# Patient Record
Sex: MALE | Race: WHITE | NOT HISPANIC OR LATINO | Employment: OTHER | URBAN - METROPOLITAN AREA
[De-identification: names, ages, dates, MRNs, and addresses within clinical notes are randomized per-mention and may not be internally consistent; named-entity substitution may affect disease eponyms.]

---

## 2017-01-02 ENCOUNTER — DOCUMENTATION ONLY (OUTPATIENT)
Dept: OTHER | Facility: CLINIC | Age: 67
End: 2017-01-02

## 2017-01-02 DIAGNOSIS — Z71.89 ACP (ADVANCE CARE PLANNING): Primary | Chronic | ICD-10-CM

## 2017-03-07 ENCOUNTER — APPOINTMENT (OUTPATIENT)
Dept: ULTRASOUND IMAGING | Facility: CLINIC | Age: 67
End: 2017-03-07
Attending: PHYSICIAN ASSISTANT
Payer: COMMERCIAL

## 2017-03-07 ENCOUNTER — HOSPITAL ENCOUNTER (OUTPATIENT)
Facility: CLINIC | Age: 67
Setting detail: OBSERVATION
Discharge: HOME | End: 2017-03-07
Attending: STUDENT IN AN ORGANIZED HEALTH CARE EDUCATION/TRAINING PROGRAM | Admitting: FAMILY MEDICINE
Payer: COMMERCIAL

## 2017-03-07 ENCOUNTER — ANESTHESIA (OUTPATIENT)
Dept: SURGERY | Facility: CLINIC | Age: 67
End: 2017-03-07
Payer: COMMERCIAL

## 2017-03-07 ENCOUNTER — APPOINTMENT (OUTPATIENT)
Dept: GENERAL RADIOLOGY | Facility: CLINIC | Age: 67
End: 2017-03-07
Attending: PHYSICIAN ASSISTANT
Payer: COMMERCIAL

## 2017-03-07 ENCOUNTER — APPOINTMENT (OUTPATIENT)
Dept: CT IMAGING | Facility: CLINIC | Age: 67
End: 2017-03-07
Attending: STUDENT IN AN ORGANIZED HEALTH CARE EDUCATION/TRAINING PROGRAM
Payer: COMMERCIAL

## 2017-03-07 ENCOUNTER — ANESTHESIA EVENT (OUTPATIENT)
Dept: SURGERY | Facility: CLINIC | Age: 67
End: 2017-03-07
Payer: COMMERCIAL

## 2017-03-07 ENCOUNTER — SURGERY (OUTPATIENT)
Age: 67
End: 2017-03-07

## 2017-03-07 VITALS
HEART RATE: 79 BPM | TEMPERATURE: 98.8 F | RESPIRATION RATE: 16 BRPM | SYSTOLIC BLOOD PRESSURE: 129 MMHG | OXYGEN SATURATION: 94 % | WEIGHT: 214.07 LBS | HEIGHT: 74 IN | DIASTOLIC BLOOD PRESSURE: 71 MMHG | BODY MASS INDEX: 27.47 KG/M2

## 2017-03-07 DIAGNOSIS — I71.40 ABDOMINAL AORTIC ANEURYSM (AAA) WITHOUT RUPTURE (H): ICD-10-CM

## 2017-03-07 DIAGNOSIS — R10.31 RLQ ABDOMINAL PAIN: ICD-10-CM

## 2017-03-07 DIAGNOSIS — N13.2 HYDRONEPHROSIS WITH URINARY OBSTRUCTION DUE TO RENAL CALCULUS: ICD-10-CM

## 2017-03-07 DIAGNOSIS — K80.20 CALCULUS OF GALLBLADDER WITHOUT CHOLECYSTITIS WITHOUT OBSTRUCTION: ICD-10-CM

## 2017-03-07 DIAGNOSIS — I48.91 ATRIAL FIBRILLATION, UNSPECIFIED TYPE (H): ICD-10-CM

## 2017-03-07 DIAGNOSIS — N20.0 KIDNEY STONE: ICD-10-CM

## 2017-03-07 PROBLEM — E87.6 HYPOKALEMIA: Status: ACTIVE | Noted: 2017-03-07

## 2017-03-07 PROBLEM — R10.9 ABDOMINAL PAIN: Status: ACTIVE | Noted: 2017-03-07

## 2017-03-07 PROBLEM — C67.9 MALIGNANT NEOPLASM OF URINARY BLADDER (H): Status: ACTIVE | Noted: 2017-01-13

## 2017-03-07 PROBLEM — C67.9 MALIGNANT NEOPLASM OF URINARY BLADDER (H): Chronic | Status: ACTIVE | Noted: 2017-01-13

## 2017-03-07 LAB
ALBUMIN SERPL-MCNC: 3.5 G/DL (ref 3.4–5)
ALBUMIN UR-MCNC: 10 MG/DL
ALP SERPL-CCNC: 102 U/L (ref 40–150)
ALT SERPL W P-5'-P-CCNC: 21 U/L (ref 0–70)
ANION GAP SERPL CALCULATED.3IONS-SCNC: 9 MMOL/L (ref 3–14)
APPEARANCE UR: ABNORMAL
AST SERPL W P-5'-P-CCNC: 25 U/L (ref 0–45)
BASOPHILS # BLD AUTO: 0.1 10E9/L (ref 0–0.2)
BASOPHILS NFR BLD AUTO: 1 %
BILIRUB SERPL-MCNC: 0.8 MG/DL (ref 0.2–1.3)
BILIRUB UR QL STRIP: NEGATIVE
BUN SERPL-MCNC: 23 MG/DL (ref 7–30)
CALCIUM SERPL-MCNC: 8.9 MG/DL (ref 8.5–10.1)
CHLORIDE SERPL-SCNC: 100 MMOL/L (ref 94–109)
CO2 SERPL-SCNC: 30 MMOL/L (ref 20–32)
COLOR UR AUTO: YELLOW
CREAT SERPL-MCNC: 1.22 MG/DL (ref 0.66–1.25)
DIFFERENTIAL METHOD BLD: NORMAL
EOSINOPHIL # BLD AUTO: 0.1 10E9/L (ref 0–0.7)
EOSINOPHIL NFR BLD AUTO: 1.4 %
ERYTHROCYTE [DISTWIDTH] IN BLOOD BY AUTOMATED COUNT: 12.2 % (ref 10–15)
GFR SERPL CREATININE-BSD FRML MDRD: 59 ML/MIN/1.7M2
GLUCOSE SERPL-MCNC: 101 MG/DL (ref 70–99)
GLUCOSE UR STRIP-MCNC: NEGATIVE MG/DL
HCT VFR BLD AUTO: 41.7 % (ref 40–53)
HGB BLD-MCNC: 14.1 G/DL (ref 13.3–17.7)
HGB UR QL STRIP: ABNORMAL
IMM GRANULOCYTES # BLD: 0 10E9/L (ref 0–0.4)
IMM GRANULOCYTES NFR BLD: 0 %
INR PPP: 3.01 (ref 0.86–1.14)
KETONES UR STRIP-MCNC: 10 MG/DL
LEUKOCYTE ESTERASE UR QL STRIP: NEGATIVE
LIPASE SERPL-CCNC: 108 U/L (ref 73–393)
LYMPHOCYTES # BLD AUTO: 0.8 10E9/L (ref 0.8–5.3)
LYMPHOCYTES NFR BLD AUTO: 10.3 %
MCH RBC QN AUTO: 31.8 PG (ref 26.5–33)
MCHC RBC AUTO-ENTMCNC: 33.8 G/DL (ref 31.5–36.5)
MCV RBC AUTO: 94 FL (ref 78–100)
MONOCYTES # BLD AUTO: 0.6 10E9/L (ref 0–1.3)
MONOCYTES NFR BLD AUTO: 8.4 %
MUCOUS THREADS #/AREA URNS LPF: PRESENT /LPF
NEUTROPHILS # BLD AUTO: 5.8 10E9/L (ref 1.6–8.3)
NEUTROPHILS NFR BLD AUTO: 78.9 %
NITRATE UR QL: NEGATIVE
PH UR STRIP: 6.5 PH (ref 5–7)
PLATELET # BLD AUTO: 210 10E9/L (ref 150–450)
POTASSIUM SERPL-SCNC: 3.2 MMOL/L (ref 3.4–5.3)
PROT SERPL-MCNC: 6.6 G/DL (ref 6.8–8.8)
RBC # BLD AUTO: 4.43 10E12/L (ref 4.4–5.9)
RBC #/AREA URNS AUTO: >182 /HPF (ref 0–2)
SODIUM SERPL-SCNC: 139 MMOL/L (ref 133–144)
SP GR UR STRIP: 1.01 (ref 1–1.03)
TROPONIN I SERPL-MCNC: NORMAL UG/L (ref 0–0.04)
URN SPEC COLLECT METH UR: ABNORMAL
UROBILINOGEN UR STRIP-MCNC: NORMAL MG/DL (ref 0–2)
WBC # BLD AUTO: 7.4 10E9/L (ref 4–11)
WBC #/AREA URNS AUTO: 0 /HPF (ref 0–2)

## 2017-03-07 PROCEDURE — 81001 URINALYSIS AUTO W/SCOPE: CPT | Performed by: STUDENT IN AN ORGANIZED HEALTH CARE EDUCATION/TRAINING PROGRAM

## 2017-03-07 PROCEDURE — 99285 EMERGENCY DEPT VISIT HI MDM: CPT | Mod: 25 | Performed by: STUDENT IN AN ORGANIZED HEALTH CARE EDUCATION/TRAINING PROGRAM

## 2017-03-07 PROCEDURE — C1769 GUIDE WIRE: HCPCS | Performed by: UROLOGY

## 2017-03-07 PROCEDURE — 36000052 ZZH SURGERY LEVEL 2 EA 15 ADDTL MIN: Performed by: UROLOGY

## 2017-03-07 PROCEDURE — 85610 PROTHROMBIN TIME: CPT | Performed by: STUDENT IN AN ORGANIZED HEALTH CARE EDUCATION/TRAINING PROGRAM

## 2017-03-07 PROCEDURE — 25000125 ZZHC RX 250: Performed by: NURSE ANESTHETIST, CERTIFIED REGISTERED

## 2017-03-07 PROCEDURE — 93010 ELECTROCARDIOGRAM REPORT: CPT | Performed by: STUDENT IN AN ORGANIZED HEALTH CARE EDUCATION/TRAINING PROGRAM

## 2017-03-07 PROCEDURE — 25800025 ZZH RX 258: Performed by: NURSE ANESTHETIST, CERTIFIED REGISTERED

## 2017-03-07 PROCEDURE — 36000054 ZZH SURGERY LEVEL 2 W FLUORO 1ST 30 MIN: Performed by: UROLOGY

## 2017-03-07 PROCEDURE — C2617 STENT, NON-COR, TEM W/O DEL: HCPCS | Performed by: UROLOGY

## 2017-03-07 PROCEDURE — 27210794 ZZH OR GENERAL SUPPLY STERILE: Performed by: UROLOGY

## 2017-03-07 PROCEDURE — 85025 COMPLETE CBC W/AUTO DIFF WBC: CPT | Performed by: STUDENT IN AN ORGANIZED HEALTH CARE EDUCATION/TRAINING PROGRAM

## 2017-03-07 PROCEDURE — 80053 COMPREHEN METABOLIC PANEL: CPT | Performed by: STUDENT IN AN ORGANIZED HEALTH CARE EDUCATION/TRAINING PROGRAM

## 2017-03-07 PROCEDURE — 40000277 XR SURGERY CARM FLUORO LESS THAN 5 MIN W STILLS

## 2017-03-07 PROCEDURE — 99285 EMERGENCY DEPT VISIT HI MDM: CPT | Mod: 25

## 2017-03-07 PROCEDURE — 74010 XR KUB: CPT | Mod: 52

## 2017-03-07 PROCEDURE — 84484 ASSAY OF TROPONIN QUANT: CPT | Performed by: STUDENT IN AN ORGANIZED HEALTH CARE EDUCATION/TRAINING PROGRAM

## 2017-03-07 PROCEDURE — 25500064 ZZH RX 255 OP 636: Performed by: STUDENT IN AN ORGANIZED HEALTH CARE EDUCATION/TRAINING PROGRAM

## 2017-03-07 PROCEDURE — 25000125 ZZHC RX 250: Performed by: STUDENT IN AN ORGANIZED HEALTH CARE EDUCATION/TRAINING PROGRAM

## 2017-03-07 PROCEDURE — 96361 HYDRATE IV INFUSION ADD-ON: CPT

## 2017-03-07 PROCEDURE — 96374 THER/PROPH/DIAG INJ IV PUSH: CPT

## 2017-03-07 PROCEDURE — 96376 TX/PRO/DX INJ SAME DRUG ADON: CPT

## 2017-03-07 PROCEDURE — 37000009 ZZH ANESTHESIA TECHNICAL FEE, EACH ADDTL 15 MIN: Performed by: UROLOGY

## 2017-03-07 PROCEDURE — 83690 ASSAY OF LIPASE: CPT | Performed by: STUDENT IN AN ORGANIZED HEALTH CARE EDUCATION/TRAINING PROGRAM

## 2017-03-07 PROCEDURE — 25000128 H RX IP 250 OP 636: Performed by: UROLOGY

## 2017-03-07 PROCEDURE — 52332 CYSTOSCOPY AND TREATMENT: CPT | Mod: RT | Performed by: UROLOGY

## 2017-03-07 PROCEDURE — 40000305 ZZH STATISTIC PRE PROC ASSESS I: Performed by: UROLOGY

## 2017-03-07 PROCEDURE — 74177 CT ABD & PELVIS W/CONTRAST: CPT

## 2017-03-07 PROCEDURE — 99236 HOSP IP/OBS SAME DATE HI 85: CPT | Performed by: FAMILY MEDICINE

## 2017-03-07 PROCEDURE — G0378 HOSPITAL OBSERVATION PER HR: HCPCS

## 2017-03-07 PROCEDURE — 25000132 ZZH RX MED GY IP 250 OP 250 PS 637: Performed by: FAMILY MEDICINE

## 2017-03-07 PROCEDURE — 37000008 ZZH ANESTHESIA TECHNICAL FEE, 1ST 30 MIN: Performed by: UROLOGY

## 2017-03-07 PROCEDURE — 76705 ECHO EXAM OF ABDOMEN: CPT

## 2017-03-07 PROCEDURE — 25000128 H RX IP 250 OP 636: Performed by: STUDENT IN AN ORGANIZED HEALTH CARE EDUCATION/TRAINING PROGRAM

## 2017-03-07 PROCEDURE — 25000128 H RX IP 250 OP 636: Performed by: PHYSICIAN ASSISTANT

## 2017-03-07 PROCEDURE — 93005 ELECTROCARDIOGRAM TRACING: CPT

## 2017-03-07 PROCEDURE — 25000128 H RX IP 250 OP 636: Performed by: NURSE ANESTHETIST, CERTIFIED REGISTERED

## 2017-03-07 PROCEDURE — 25000132 ZZH RX MED GY IP 250 OP 250 PS 637: Performed by: PHYSICIAN ASSISTANT

## 2017-03-07 PROCEDURE — 96375 TX/PRO/DX INJ NEW DRUG ADDON: CPT

## 2017-03-07 PROCEDURE — 71000012 ZZH RECOVERY PHASE 1 LEVEL 1 FIRST HR: Performed by: UROLOGY

## 2017-03-07 PROCEDURE — 99202 OFFICE O/P NEW SF 15 MIN: CPT | Mod: 25 | Performed by: UROLOGY

## 2017-03-07 DEVICE — STENT URETERAL DBL PIGTAIL INLAY 6FRX26CM 778626: Type: IMPLANTABLE DEVICE | Site: URETER | Status: FUNCTIONAL

## 2017-03-07 RX ORDER — ONDANSETRON 2 MG/ML
4 INJECTION INTRAMUSCULAR; INTRAVENOUS EVERY 30 MIN PRN
Status: DISCONTINUED | OUTPATIENT
Start: 2017-03-07 | End: 2017-03-07 | Stop reason: HOSPADM

## 2017-03-07 RX ORDER — AMLODIPINE BESYLATE 5 MG/1
5 TABLET ORAL DAILY
Status: DISCONTINUED | OUTPATIENT
Start: 2017-03-07 | End: 2017-03-07 | Stop reason: HOSPADM

## 2017-03-07 RX ORDER — HYDROMORPHONE HYDROCHLORIDE 1 MG/ML
.3-.5 INJECTION, SOLUTION INTRAMUSCULAR; INTRAVENOUS; SUBCUTANEOUS EVERY 5 MIN PRN
Status: DISCONTINUED | OUTPATIENT
Start: 2017-03-07 | End: 2017-03-07 | Stop reason: HOSPADM

## 2017-03-07 RX ORDER — DEXAMETHASONE SODIUM PHOSPHATE 4 MG/ML
INJECTION, SOLUTION INTRA-ARTICULAR; INTRALESIONAL; INTRAMUSCULAR; INTRAVENOUS; SOFT TISSUE PRN
Status: DISCONTINUED | OUTPATIENT
Start: 2017-03-07 | End: 2017-03-07

## 2017-03-07 RX ORDER — PROCHLORPERAZINE MALEATE 5 MG
5 TABLET ORAL EVERY 6 HOURS PRN
Status: DISCONTINUED | OUTPATIENT
Start: 2017-03-07 | End: 2017-03-07 | Stop reason: HOSPADM

## 2017-03-07 RX ORDER — ATENOLOL AND CHLORTHALIDONE TABLET 50; 25 MG/1; MG/1
1 TABLET ORAL DAILY
Status: DISCONTINUED | OUTPATIENT
Start: 2017-03-07 | End: 2017-03-07 | Stop reason: CLARIF

## 2017-03-07 RX ORDER — POTASSIUM CHLORIDE 1.5 G/1.58G
20-40 POWDER, FOR SOLUTION ORAL
Status: DISCONTINUED | OUTPATIENT
Start: 2017-03-07 | End: 2017-03-07 | Stop reason: HOSPADM

## 2017-03-07 RX ORDER — POTASSIUM CHLORIDE 29.8 MG/ML
20 INJECTION INTRAVENOUS
Status: DISCONTINUED | OUTPATIENT
Start: 2017-03-07 | End: 2017-03-07 | Stop reason: ALTCHOICE

## 2017-03-07 RX ORDER — ACETAMINOPHEN 325 MG/1
650 TABLET ORAL EVERY 4 HOURS PRN
Status: DISCONTINUED | OUTPATIENT
Start: 2017-03-07 | End: 2017-03-07 | Stop reason: HOSPADM

## 2017-03-07 RX ORDER — HYDROCODONE BITARTRATE AND ACETAMINOPHEN 5; 325 MG/1; MG/1
1-2 TABLET ORAL EVERY 4 HOURS PRN
Qty: 20 TABLET | Refills: 0 | Status: ON HOLD | OUTPATIENT
Start: 2017-03-07 | End: 2023-03-21

## 2017-03-07 RX ORDER — AMOXICILLIN 250 MG
1-2 CAPSULE ORAL 2 TIMES DAILY
Status: DISCONTINUED | OUTPATIENT
Start: 2017-03-07 | End: 2017-03-07 | Stop reason: HOSPADM

## 2017-03-07 RX ORDER — LOSARTAN POTASSIUM 50 MG/1
100 TABLET ORAL DAILY
Status: DISCONTINUED | OUTPATIENT
Start: 2017-03-07 | End: 2017-03-07 | Stop reason: HOSPADM

## 2017-03-07 RX ORDER — PROCHLORPERAZINE 25 MG
12.5 SUPPOSITORY, RECTAL RECTAL EVERY 12 HOURS PRN
Status: DISCONTINUED | OUTPATIENT
Start: 2017-03-07 | End: 2017-03-07 | Stop reason: HOSPADM

## 2017-03-07 RX ORDER — PROPOFOL 10 MG/ML
INJECTION, EMULSION INTRAVENOUS CONTINUOUS PRN
Status: DISCONTINUED | OUTPATIENT
Start: 2017-03-07 | End: 2017-03-07

## 2017-03-07 RX ORDER — ATENOLOL 50 MG/1
50 TABLET ORAL DAILY
Status: DISCONTINUED | OUTPATIENT
Start: 2017-03-07 | End: 2017-03-07 | Stop reason: HOSPADM

## 2017-03-07 RX ORDER — ONDANSETRON 2 MG/ML
INJECTION INTRAMUSCULAR; INTRAVENOUS PRN
Status: DISCONTINUED | OUTPATIENT
Start: 2017-03-07 | End: 2017-03-07

## 2017-03-07 RX ORDER — NALOXONE HYDROCHLORIDE 0.4 MG/ML
.1-.4 INJECTION, SOLUTION INTRAMUSCULAR; INTRAVENOUS; SUBCUTANEOUS
Status: DISCONTINUED | OUTPATIENT
Start: 2017-03-07 | End: 2017-03-07 | Stop reason: HOSPADM

## 2017-03-07 RX ORDER — TAMSULOSIN HYDROCHLORIDE 0.4 MG/1
0.4 CAPSULE ORAL DAILY
Status: DISCONTINUED | OUTPATIENT
Start: 2017-03-07 | End: 2017-03-07 | Stop reason: HOSPADM

## 2017-03-07 RX ORDER — LIDOCAINE HYDROCHLORIDE 10 MG/ML
INJECTION, SOLUTION INFILTRATION; PERINEURAL PRN
Status: DISCONTINUED | OUTPATIENT
Start: 2017-03-07 | End: 2017-03-07

## 2017-03-07 RX ORDER — POTASSIUM CHLORIDE 7.45 MG/ML
10 INJECTION INTRAVENOUS
Status: DISCONTINUED | OUTPATIENT
Start: 2017-03-07 | End: 2017-03-07 | Stop reason: HOSPADM

## 2017-03-07 RX ORDER — SODIUM CHLORIDE, SODIUM LACTATE, POTASSIUM CHLORIDE, CALCIUM CHLORIDE 600; 310; 30; 20 MG/100ML; MG/100ML; MG/100ML; MG/100ML
INJECTION, SOLUTION INTRAVENOUS CONTINUOUS PRN
Status: DISCONTINUED | OUTPATIENT
Start: 2017-03-07 | End: 2017-03-07

## 2017-03-07 RX ORDER — HYDROMORPHONE HYDROCHLORIDE 1 MG/ML
0.5 INJECTION, SOLUTION INTRAMUSCULAR; INTRAVENOUS; SUBCUTANEOUS
Status: DISCONTINUED | OUTPATIENT
Start: 2017-03-07 | End: 2017-03-07 | Stop reason: DRUGHIGH

## 2017-03-07 RX ORDER — FENTANYL CITRATE 50 UG/ML
25-50 INJECTION, SOLUTION INTRAMUSCULAR; INTRAVENOUS
Status: DISCONTINUED | OUTPATIENT
Start: 2017-03-07 | End: 2017-03-07 | Stop reason: HOSPADM

## 2017-03-07 RX ORDER — POTASSIUM CHLORIDE 1500 MG/1
20-40 TABLET, EXTENDED RELEASE ORAL
Status: DISCONTINUED | OUTPATIENT
Start: 2017-03-07 | End: 2017-03-07

## 2017-03-07 RX ORDER — CEFAZOLIN SODIUM 2 G/100ML
2 INJECTION, SOLUTION INTRAVENOUS
Status: COMPLETED | OUTPATIENT
Start: 2017-03-07 | End: 2017-03-07

## 2017-03-07 RX ORDER — ONDANSETRON 2 MG/ML
4 INJECTION INTRAMUSCULAR; INTRAVENOUS EVERY 6 HOURS PRN
Status: DISCONTINUED | OUTPATIENT
Start: 2017-03-07 | End: 2017-03-07 | Stop reason: HOSPADM

## 2017-03-07 RX ORDER — CHLORTHALIDONE 25 MG/1
25 TABLET ORAL DAILY
Status: DISCONTINUED | OUTPATIENT
Start: 2017-03-07 | End: 2017-03-07 | Stop reason: HOSPADM

## 2017-03-07 RX ORDER — POTASSIUM CHLORIDE 1.5 G/1.58G
20-40 POWDER, FOR SOLUTION ORAL
Status: DISCONTINUED | OUTPATIENT
Start: 2017-03-07 | End: 2017-03-07

## 2017-03-07 RX ORDER — POTASSIUM CHLORIDE 1500 MG/1
20-40 TABLET, EXTENDED RELEASE ORAL
Status: DISCONTINUED | OUTPATIENT
Start: 2017-03-07 | End: 2017-03-07 | Stop reason: HOSPADM

## 2017-03-07 RX ORDER — POTASSIUM CHLORIDE 7.45 MG/ML
10 INJECTION INTRAVENOUS
Status: DISCONTINUED | OUTPATIENT
Start: 2017-03-07 | End: 2017-03-07

## 2017-03-07 RX ORDER — DEXAMETHASONE SODIUM PHOSPHATE 4 MG/ML
4 INJECTION, SOLUTION INTRA-ARTICULAR; INTRALESIONAL; INTRAMUSCULAR; INTRAVENOUS; SOFT TISSUE
Status: DISCONTINUED | OUTPATIENT
Start: 2017-03-07 | End: 2017-03-07 | Stop reason: HOSPADM

## 2017-03-07 RX ORDER — FENTANYL CITRATE 50 UG/ML
INJECTION, SOLUTION INTRAMUSCULAR; INTRAVENOUS PRN
Status: DISCONTINUED | OUTPATIENT
Start: 2017-03-07 | End: 2017-03-07

## 2017-03-07 RX ORDER — IOPAMIDOL 755 MG/ML
100 INJECTION, SOLUTION INTRAVASCULAR ONCE
Status: COMPLETED | OUTPATIENT
Start: 2017-03-07 | End: 2017-03-07

## 2017-03-07 RX ORDER — ONDANSETRON 2 MG/ML
4 INJECTION INTRAMUSCULAR; INTRAVENOUS ONCE
Status: COMPLETED | OUTPATIENT
Start: 2017-03-07 | End: 2017-03-07

## 2017-03-07 RX ORDER — ONDANSETRON 4 MG/1
4 TABLET, ORALLY DISINTEGRATING ORAL EVERY 30 MIN PRN
Status: DISCONTINUED | OUTPATIENT
Start: 2017-03-07 | End: 2017-03-07 | Stop reason: HOSPADM

## 2017-03-07 RX ORDER — NALOXONE HYDROCHLORIDE 0.4 MG/ML
.1-.4 INJECTION, SOLUTION INTRAMUSCULAR; INTRAVENOUS; SUBCUTANEOUS
Status: DISCONTINUED | OUTPATIENT
Start: 2017-03-07 | End: 2017-03-07

## 2017-03-07 RX ORDER — HYDROMORPHONE HYDROCHLORIDE 1 MG/ML
.3-.5 INJECTION, SOLUTION INTRAMUSCULAR; INTRAVENOUS; SUBCUTANEOUS
Status: DISCONTINUED | OUTPATIENT
Start: 2017-03-07 | End: 2017-03-07 | Stop reason: HOSPADM

## 2017-03-07 RX ORDER — SODIUM CHLORIDE 9 MG/ML
INJECTION, SOLUTION INTRAVENOUS CONTINUOUS
Status: DISCONTINUED | OUTPATIENT
Start: 2017-03-07 | End: 2017-03-07

## 2017-03-07 RX ORDER — DIPHENHYDRAMINE HYDROCHLORIDE 50 MG/ML
25 INJECTION INTRAMUSCULAR; INTRAVENOUS ONCE
Status: COMPLETED | OUTPATIENT
Start: 2017-03-07 | End: 2017-03-07

## 2017-03-07 RX ORDER — LIDOCAINE 40 MG/G
CREAM TOPICAL
Status: DISCONTINUED | OUTPATIENT
Start: 2017-03-07 | End: 2017-03-07

## 2017-03-07 RX ORDER — ONDANSETRON 4 MG/1
4 TABLET, ORALLY DISINTEGRATING ORAL EVERY 6 HOURS PRN
Status: DISCONTINUED | OUTPATIENT
Start: 2017-03-07 | End: 2017-03-07 | Stop reason: HOSPADM

## 2017-03-07 RX ORDER — WARFARIN SODIUM 2.5 MG/1
TABLET ORAL
Qty: 60 TABLET | Refills: 0 | Status: SHIPPED | OUTPATIENT
Start: 2017-03-07 | End: 2023-03-20

## 2017-03-07 RX ADMIN — LIDOCAINE HYDROCHLORIDE 50 MG: 10 INJECTION, SOLUTION INFILTRATION; PERINEURAL at 12:20

## 2017-03-07 RX ADMIN — SODIUM CHLORIDE: 9 INJECTION, SOLUTION INTRAVENOUS at 07:41

## 2017-03-07 RX ADMIN — IOPAMIDOL 100 ML: 755 INJECTION, SOLUTION INTRAVENOUS at 01:52

## 2017-03-07 RX ADMIN — SODIUM CHLORIDE 66 ML: 9 INJECTION, SOLUTION INTRAVENOUS at 01:52

## 2017-03-07 RX ADMIN — HYDROMORPHONE HYDROCHLORIDE 0.5 MG: 10 INJECTION, SOLUTION INTRAMUSCULAR; INTRAVENOUS; SUBCUTANEOUS at 01:37

## 2017-03-07 RX ADMIN — CHLORTHALIDONE 25 MG: 25 TABLET ORAL at 08:16

## 2017-03-07 RX ADMIN — FENTANYL CITRATE 25 MCG: 50 INJECTION, SOLUTION INTRAMUSCULAR; INTRAVENOUS at 12:33

## 2017-03-07 RX ADMIN — FENTANYL CITRATE 25 MCG: 50 INJECTION, SOLUTION INTRAMUSCULAR; INTRAVENOUS at 12:53

## 2017-03-07 RX ADMIN — CEFAZOLIN SODIUM 2 G: 2 INJECTION, SOLUTION INTRAVENOUS at 12:10

## 2017-03-07 RX ADMIN — ATENOLOL 50 MG: 50 TABLET ORAL at 08:15

## 2017-03-07 RX ADMIN — HYDROMORPHONE HYDROCHLORIDE 0.5 MG: 10 INJECTION, SOLUTION INTRAMUSCULAR; INTRAVENOUS; SUBCUTANEOUS at 03:02

## 2017-03-07 RX ADMIN — HYDROMORPHONE HYDROCHLORIDE 0.5 MG: 10 INJECTION, SOLUTION INTRAMUSCULAR; INTRAVENOUS; SUBCUTANEOUS at 07:44

## 2017-03-07 RX ADMIN — PROPOFOL 75 MCG/KG/MIN: 10 INJECTION, EMULSION INTRAVENOUS at 12:20

## 2017-03-07 RX ADMIN — SODIUM CHLORIDE 1000 ML: 9 INJECTION, SOLUTION INTRAVENOUS at 02:59

## 2017-03-07 RX ADMIN — ONDANSETRON 4 MG: 2 INJECTION INTRAMUSCULAR; INTRAVENOUS at 12:20

## 2017-03-07 RX ADMIN — PHENYLEPHRINE HYDROCHLORIDE 100 MCG: 10 INJECTION, SOLUTION INTRAMUSCULAR; INTRAVENOUS; SUBCUTANEOUS at 12:36

## 2017-03-07 RX ADMIN — DIPHENHYDRAMINE HYDROCHLORIDE 25 MG: 50 INJECTION, SOLUTION INTRAMUSCULAR; INTRAVENOUS at 01:55

## 2017-03-07 RX ADMIN — HYDROMORPHONE HYDROCHLORIDE 0.5 MG: 10 INJECTION, SOLUTION INTRAMUSCULAR; INTRAVENOUS; SUBCUTANEOUS at 05:04

## 2017-03-07 RX ADMIN — AMLODIPINE BESYLATE 5 MG: 5 TABLET ORAL at 08:16

## 2017-03-07 RX ADMIN — PHENYLEPHRINE HYDROCHLORIDE 150 MCG: 10 INJECTION, SOLUTION INTRAMUSCULAR; INTRAVENOUS; SUBCUTANEOUS at 12:49

## 2017-03-07 RX ADMIN — MIDAZOLAM HYDROCHLORIDE 2 MG: 1 INJECTION, SOLUTION INTRAMUSCULAR; INTRAVENOUS at 12:10

## 2017-03-07 RX ADMIN — SODIUM CHLORIDE, POTASSIUM CHLORIDE, SODIUM LACTATE AND CALCIUM CHLORIDE: 600; 310; 30; 20 INJECTION, SOLUTION INTRAVENOUS at 12:54

## 2017-03-07 RX ADMIN — DEXAMETHASONE SODIUM PHOSPHATE 4 MG: 4 INJECTION, SOLUTION INTRAMUSCULAR; INTRAVENOUS at 12:20

## 2017-03-07 RX ADMIN — ONDANSETRON 4 MG: 2 INJECTION INTRAMUSCULAR; INTRAVENOUS at 01:35

## 2017-03-07 RX ADMIN — SODIUM CHLORIDE 1000 ML: 9 INJECTION, SOLUTION INTRAVENOUS at 01:35

## 2017-03-07 RX ADMIN — PHENYLEPHRINE HYDROCHLORIDE 150 MCG: 10 INJECTION, SOLUTION INTRAMUSCULAR; INTRAVENOUS; SUBCUTANEOUS at 12:43

## 2017-03-07 RX ADMIN — SODIUM CHLORIDE, POTASSIUM CHLORIDE, SODIUM LACTATE AND CALCIUM CHLORIDE: 600; 310; 30; 20 INJECTION, SOLUTION INTRAVENOUS at 12:00

## 2017-03-07 RX ADMIN — LOSARTAN POTASSIUM 100 MG: 50 TABLET ORAL at 08:16

## 2017-03-07 RX ADMIN — HYDROMORPHONE HYDROCHLORIDE 0.5 MG: 10 INJECTION, SOLUTION INTRAMUSCULAR; INTRAVENOUS; SUBCUTANEOUS at 11:05

## 2017-03-07 ASSESSMENT — LIFESTYLE VARIABLES: TOBACCO_USE: 1

## 2017-03-07 ASSESSMENT — ENCOUNTER SYMPTOMS: DYSRHYTHMIAS: 1

## 2017-03-07 NOTE — PROGRESS NOTES
9:02 AM  Pt with 9 mm right ureteral stone.  Dr Acosta plans on placing a stent today.  Then eventual holding of anticoagulation and definitive rx.  Will follow along.  k was 3.2--replacement ordered.

## 2017-03-07 NOTE — DISCHARGE SUMMARY
[facility]    Discharge Summary  Hospital Medicine    Date of Admission:  3/7/2017  Date of Discharge:  3/7/2017   Discharging Provider: Norma Ramos  Date of Service: 3/7/2017     Primary Care     [facility] [address] [facility] [address]     PLEASE SEE H AND P; admission and discharge are same day.     Identification and Chief Complaint: Jeff Vanegas is a 66 year old male who presented on 3/7/2017 with complaint of RUQ pain.    Discharge Diagnoses       Nephrolithiasis    Atrial fibrillation (H)    Essential hypertension    Tobacco use    Depression    HLD (hyperlipidemia)    Hypokalemia      Discharge Disposition   Discharged to home    Discharge Orders     Reason for your hospital stay   Right kidney stone     Follow-up and recommended labs and tests    Follow up with Dr. Acosta as instructed     Activity   Your activity upon discharge: activity as tolerated     Full Code     Diet   Follow this diet upon discharge: Orders Placed This Encounter     Regular Diet Adult       Discharge Medications   Current Discharge Medication List      START taking these medications    Details   HYDROcodone-acetaminophen (NORCO) 5-325 MG per tablet Take 1-2 tablets by mouth every 4 hours as needed for moderate to severe pain maximum 8 tablet(s) per day  Qty: 20 tablet, Refills: 0    Associated Diagnoses: Kidney stone         CONTINUE these medications which have NOT CHANGED    Details   VITAMIN D, CHOLECALCIFEROL, PO Take 1 tablet by mouth daily      warfarin (COUMADIN) 2.5 MG tablet Take 1 tablet (2.5mg) daily. Recheck INR on Wed or Thursday (11/9 or 11/10).  Further warfarin dosing per [facility] anticoagulation clinic  Qty: 60 tablet, Refills: 0    Associated Diagnoses: Atrial fibrillation, unspecified type (H)      tamsulosin (FLOMAX) 0.4 MG 24 hr capsule Take 0.4 mg by mouth every evening       LOSARTAN POTASSIUM PO Take 100 mg by mouth daily      Multiple  Vitamins-Minerals (MULTIVITAMIN ADULT) TABS Take 1 tablet by mouth daily      Probiotic Product (PROBIOTIC DAILY PO) Take 1 capsule by mouth daily       amLODIPine (NORVASC) 5 MG tablet Take 5 mg by mouth daily       atenolol-chlorthalidone (TENORETIC) 50-25 MG per tablet Take 1 tablet by mouth daily.           Allergies   Allergies   Allergen Reactions     Diatrizoate Hives     IV dye USED DURING KIDNEY SCAN         Consultations This Hospital Stay   Consultation during this admission received from urology    UROLOGY IP CONSULT  PHARMACY TO DOSE WARFARIN    Significant Results and Procedures   Procedures    Stent placement to right ureter    Data   Results for orders placed or performed during the hospital encounter of 03/07/17   CT Abdomen Pelvis w Contrast    Narrative    CT ABDOMEN PELVIS W CONTRAST  3/7/2017 2:00 AM      HISTORY: Right lower quadrant pain.    TECHNIQUE:  CT abdomen and pelvis with intravenous contrast. Radiation  dose for this scan was reduced using automated exposure control,  adjustment of the mA and/or kV according to patient size, or iterative  reconstruction technique. 100 mL Isovue-370.     COMPARISON: None.    FINDINGS:    Abdomen: There is atelectasis and/or scarring at the lung bases. There  are coronary artery atherosclerotic calcifications. The heart is  enlarged. Right hemidiaphragm is elevated. The liver is normal in  appearance. Gallstone in the gallbladder. 1 cm lesion in the inferior  spleen is indeterminate but may be a cyst. The pancreas and adrenal  glands are normal in appearance. There is mild dilatation of the right  renal collecting system caused by a 0.9 cm stone at the ureteropelvic  junction. There is a large cyst at the upper pole of the right kidney  measuring 9.4 cm. The left kidney is normal in appearance. There is no  abdominal or pelvic lymph node enlargement. There is atherosclerotic  calcification of the aorta and its branches. The distal aorta is  aneurysmal  at 3.6 cm AP. The common iliac arteries are aneurysmal  bilaterally measuring 2.1 cm on the right and 1.9 cm on the left.    Pelvis: The prostate gland is enlarged and contains multiple  calcifications. There is a small amount of air in the urinary bladder.  There is no bowel obstruction or inflammation. No free intraperitoneal  gas or fluid. Degenerative disease in the spine and hips.      Impression    IMPRESSION:  1. There is a 0.9 cm stone at the right ureteropelvic junction causing  mild hydronephrosis.  2. Cholelithiasis.  3. Coronary artery atherosclerotic calcifications and cardiomegaly.  4. 3.6 cm abdominal aortic aneurysm. Bilateral common iliac artery  aneurysms.  5. Prostate gland enlargement.  6. Small amount of air in the urinary bladder.   US Abdomen Limited    Narrative    ULTRASOUND ABDOMEN LIMITED  3/7/2017 9:23 AM     HISTORY: Cholelithiasis.    FINDINGS: There is a 2.5 cm gallstone without wall thickening.  Negative sonographic Mckenzie's sign. The left liver lobe was not well  seen. Otherwise, no focal hepatic lesion or common bile duct  dilatation. The pancreas is obscured. Mild-moderate right  hydronephrosis. Exophytic right renal cyst measuring 9.7 cm.      Impression    IMPRESSION:   1. Cholelithiasis.  2. Mild-moderate right hydronephrosis.    PASQUALE العراقي MD   XR KUB    Narrative    XR KUB 3/7/2017 8:40 AM    HISTORY: Ureteral stone.    COMPARISON: CT done today.      Impression    IMPRESSION: A single view of the abdomen shows an 8 mm stone at the  right ureteropelvic junction. There is contrast in the right  intrarenal collecting system proximal to this from the recent CT.  There is mild-moderate pelvicalyceal dilatation. There also is some  excreted contrast seen in the distal right ureter suggesting that the  right UPJ stone is not causing complete obstruction. Some nonspecific  gaseous distention of bowel is noted.    NOLA HAYNES MD       History of Present Illness   (From  H&P)     Please see H and P.  Admission and discharge are same day.    Hospital Course   Jeff Vanegas was admitted on 3/7/2017.  The following problems were addressed during his hospitalization:    Nephrolithiasis  Helical CT revealed a 9mm right ureteropelvic stone on 03/06. UA was negative for infection. The patient was made NPO the morning of 03/07.  Urology was consulted.  They requested KUB which showed 8mm stone to right ureter.  The patient underwent stent placement at 12-noon 03/07.  Vitals were stable.  Urine culture was pending on discharge.  Pain was controlled with dilaudid, Toradol and tylenol during admission.  The patient continued his PTA Flomax throughout admission.  Patient discharged with instructions to follow up with urology at their digression for stent removal and possible lithotripsy.  The patient was discharged with 20 norco pills with instructions to take every 4 hours, as needed for pain control s/p stent placement.    Hypokalemia  Present on admission.  This was corrected     Atrial fibrillation (H)  INR 3.01 on admission. Continue PTA warfarin on discharge.  Instructed patient to discuss a time at which this should be discontinued given possible pending lithotripsy.     Essential hypertension  Continue PTA losartan, amlodipine, atenolol, chlorthalidone on discharge     Tobacco use  Minimal use over the last four months. Reports 1 cigar a month. Reports that he previously smoked 1 cigar per day for a period of 40 years.    Pending Results   Unresulted Labs Ordered in the Past 30 Days of this Admission     No orders found from 1/6/2017 to 3/8/2017.          Physical Exam   Temp:  [97.6  F (36.4  C)-98.8  F (37.1  C)] 98.8  F (37.1  C)  Pulse:  [] 62  Heart Rate:  [82-96] 84  Resp:  [12-20] 16  BP: (108-148)/() 131/98  FiO2 (%):  [2 %] 2 %  SpO2:  [86 %-98 %] 94 %  Vitals:    03/07/17 0043 03/07/17 0418   Weight: 95.3 kg (210 lb) 97.1 kg (214 lb 1.1 oz)       Physical Exam:  please see H and P, admission and discharge are same day.    The discharge plan was discussed with the patient and patient agrees to plan.    Total time on this discharge was greater than 30 minutes.    Norma Ramos PA-C  [facility]

## 2017-03-07 NOTE — OP NOTE
Pre operative diagnosis:  Right ureteral stone    Post operative diagnosis:  same    Procedure performed:  Right ureteral stent placement    Surgeon: Leo Acosta MD    Anesthesia:  MAC    Blood loss:  0 cc    Description of procedure:  After the patient was prepped and draped in the dorsal lithotomy position the urethra and bladder were inspected. The bladder was without evidence of recurrent bladder cancer.    The right ureteral orifice was identified a sensor guide wire was advanced transureterally into the right renal collecting system bypassing the ureteral stone. The stone appeared to then move back into the renal collecting system.    A 6 x 26 ureteral stent was then advanced transureterally over the guide wire and when the proximal and distal curls were in good position the extraction string was removed.    The bladder was then drained and the patient was taken to the recovery room in satisfactory condition.

## 2017-03-07 NOTE — IP AVS SNAPSHOT
MRN:[id number]                      After Visit Summary   3/7/2017    Jeff Vanegas    MRN: [id number]           Thank you!     Thank you for choosing [facility] for your care. Our goal is always to provide you with excellent care. Hearing back from our patients is one way we can continue to improve our services. Please take a few minutes to complete the written survey that you may receive in the mail after you visit with us. Thank you!        Patient Information     Date Of Birth          1950        About your hospital stay     You were admitted on:  March 7, 2017 You last received care in the:  [facility]    You were discharged on:  March 7, 2017        Reason for your hospital stay       Right kidney stone                  Who to Call     For medical emergencies, please call 911.  For non-urgent questions about your medical care, please call your primary care provider or clinic, [phone]  For questions related to your surgery, please call your surgery clinic        Attending Provider     Provider Specialty    Clifford Tatum,  Emergency Medicine    Bradley Garcia MD [facility], Scott Early MD [facility]       Primary Care Provider Office Phone # Fax #    [facility] [phone]       [place] [id number] [place] [address]        After Care Instructions     Activity       Your activity upon discharge: activity as tolerated            Diet       Follow this diet upon discharge: Orders Placed This Encounter      Regular Diet Adult                  Follow-up Appointments     Follow-up and recommended labs and tests        Follow up with Dr. Acosta as instructed                  Further instructions from your care team       Please follow up with Dr. Acosta (or one of his partners) as instructed.    We have given you 20 tablets of Norco (a stronger pain medicine than tylenol or ibuprofen).  You may  "take 1-2 tablets every 4 hours for severe pain.  Please do not take more than 8 tabs in a 24 hour period.    Please call you PCP or return to the ED should you develop significant pain, fever, difficulty breathing, or chest pain.    Warfarin Instruction     You have started taking a medicine called warfarin. This is a blood-thinning medicine (anticoagulant). It helps prevent and treat blood clots.      Before leaving the hospital, make sure you know how much to take and how long to take it.      You will need regular blood tests to make sure your blood is clotting safely. It is very important to see your doctor for regular blood tests.    Talk to your doctor before taking any new medicine (this includes over-the-counter drugs and herbal products). Many medicines can interact with warfarin. This may cause more bleeding or too much clotting.     Eating a lot of vitamin K--found in green, leafy vegetables--can change the way warfarin works in your body. Do NOT avoid these foods. Instead, try to eat the same amount each day.     Bleeding is the most common side-effect of warfarin. You may notice bleeding gums, a bloody nose, bruises and bleeding longer when you cut yourself. See a doctor at once if:   o You cough up blood  o You find blood in your stool (poop)  o You have a deep cut, or a cut that bleeds longer than 10 minutes   o You have a bad cut, hard fall, accident or hit your head (go to urgent care or the emergency room).    For women who can get pregnant: This medicine can harm an unborn baby. Be very careful not to get pregnant while taking this medicine. If you think you might be pregnant, call your doctor right away.    For more information, read \"Guide to Warfarin Therapy,  the booklet you received in the hospital.        Pending Results     Date and Time Order Name Status Description    3/7/2017 0817 XR Surgery MECHE L/T 5 Min Fluoro w Stills In process     3/7/2017 0110 CT Abdomen Pelvis w Contrast " "Preliminary             Statement of Approval     Ordered          03/07/17 1619  I have reviewed and agree with all the recommendations and orders detailed in this document.  EFFECTIVE NOW     Approved and electronically signed by:  Norma Ramos PA-C             Admission Information     Date & Time Provider Department Dept. Phone    3/7/2017 Scott Thompson MD [facility] [phone]      Your Vitals Were     Blood Pressure Pulse Temperature Respirations Height Weight    129/71 (BP Location: Left arm) 79 98.8  F (37.1  C) (Oral) 16 1.88 m (6' 2\") 97.1 kg (214 lb 1.1 oz)    Pulse Oximetry BMI (Body Mass Index)                94% 27.48 kg/m2          [employer] Information     [employer] gives you secure access to your electronic health record. If you see a primary care provider, you can also send messages to your care team and make appointments. If you have questions, please call your primary care clinic.  If you do not have a primary care provider, please call [phone] and they will assist you.        Care EveryWhere ID     This is your Care EveryWhere ID. This could be used by other organizations to access your [facility] medical records  [id number]           Review of your medicines      START taking        Dose / Directions    HYDROcodone-acetaminophen 5-325 MG per tablet   Commonly known as:  NORCO   Used for:  Kidney stone        Dose:  1-2 tablet   Take 1-2 tablets by mouth every 4 hours as needed for moderate to severe pain maximum 8 tablet(s) per day   Quantity:  20 tablet   Refills:  0         CONTINUE these medicines which may have CHANGED, or have new prescriptions. If we are uncertain of the size of tablets/capsules you have at home, strength may be listed as something that might have changed.        Dose / Directions    warfarin 2.5 MG tablet   Commonly known as:  COUMADIN   This may have changed:  additional instructions   Used for:  Atrial fibrillation, unspecified " type (H)        Take 1 tablet (2.5mg) daily. Recheck INR as instructed prior to admission.  Further warfarin dosing per [facility] anticoagulation clinic   Quantity:  60 tablet   Refills:  0         CONTINUE these medicines which have NOT CHANGED        Dose / Directions    amLODIPine 5 MG tablet   Commonly known as:  NORVASC        Dose:  5 mg   Take 5 mg by mouth daily   Refills:  0       atenolol-chlorthalidone 50-25 MG per tablet   Commonly known as:  TENORETIC        Dose:  1 tablet   Take 1 tablet by mouth daily.   Refills:  0       FLOMAX 0.4 MG capsule   Generic drug:  tamsulosin        Dose:  0.4 mg   Take 0.4 mg by mouth every evening   Refills:  0       LOSARTAN POTASSIUM PO        Dose:  100 mg   Take 100 mg by mouth daily   Refills:  0       MULTIVITAMIN ADULT Tabs        Dose:  1 tablet   Take 1 tablet by mouth daily   Refills:  0       PROBIOTIC DAILY PO        Dose:  1 capsule   Take 1 capsule by mouth daily   Refills:  0       VITAMIN D (CHOLECALCIFEROL) PO        Dose:  1 tablet   Take 1 tablet by mouth daily   Refills:  0            Where to get your medicines      [place] [facility] [place] [address]  [address] [address]     Phone:  [phone]     warfarin 2.5 MG tablet         Some of these will need a paper prescription and others can be bought over the counter. Ask your nurse if you have questions.     Bring a paper prescription for each of these medications     HYDROcodone-acetaminophen 5-325 MG per tablet                Protect others around you: Learn how to safely use, store and throw away your medicines at www.disposemymeds.org.             Medication List: This is a list of all your medications and when to take them. Check marks below indicate your daily home schedule. Keep this list as a reference.      Medications           Morning Afternoon Evening Bedtime As Needed    amLODIPine 5 MG tablet   Commonly known as:   NORVASC   Take 5 mg by mouth daily   Last time this was given:  5 mg on 3/7/2017  8:16 AM   Next Dose Due:  3/8/17                                   atenolol-chlorthalidone 50-25 MG per tablet   Commonly known as:  TENORETIC   Take 1 tablet by mouth daily.   Next Dose Due:  Resume home routine                                FLOMAX 0.4 MG capsule   Take 0.4 mg by mouth every evening   Generic drug:  tamsulosin   Next Dose Due:  Resume home routine                                HYDROcodone-acetaminophen 5-325 MG per tablet   Commonly known as:  NORCO   Take 1-2 tablets by mouth every 4 hours as needed for moderate to severe pain maximum 8 tablet(s) per day                                   LOSARTAN POTASSIUM PO   Take 100 mg by mouth daily   Last time this was given:  100 mg on 3/7/2017  8:16 AM   Next Dose Due:  3/8/17                                   MULTIVITAMIN ADULT Tabs   Take 1 tablet by mouth daily   Next Dose Due:  Resume home routine                                PROBIOTIC DAILY PO   Take 1 capsule by mouth daily   Next Dose Due:  Resume home routine                                VITAMIN D (CHOLECALCIFEROL) PO   Take 1 tablet by mouth daily   Next Dose Due:  Resume home routine                                warfarin 2.5 MG tablet   Commonly known as:  COUMADIN   Take 1 tablet (2.5mg) daily. Recheck INR as instructed prior to admission.  Further warfarin dosing per [facility] anticoagulation clinic

## 2017-03-07 NOTE — PLAN OF CARE
"Problem: Goal Outcome Summary  Goal: Goal Outcome Summary  Outcome: Improving  Patient currently denying any pain. Hoping to discharge home this afternoon. Currently on 1L oxygen with o2 sats 92-93%. Writer will assess patient's o2 sats on room air following him finishing eating while walking in [place]. Denying any nausea. Voided without problem. /71 (BP Location: Left arm)  Pulse 79  Temp 98.8  F (37.1  C) (Oral)  Resp 16  Ht 1.88 m (6' 2\")  Wt 97.1 kg (214 lb 1.1 oz)  SpO2 94%  BMI 27.48 kg/m2          "

## 2017-03-07 NOTE — ANESTHESIA POSTPROCEDURE EVALUATION
Patient: Jeff Vanegas    Procedure(s):  Cysto, ureteroscopy right stent placement - Wound Class: II-Clean Contaminated    Diagnosis:Right ureteral stone  Diagnosis Additional Information: No value filed.    Anesthesia Type:  General    Note:  Anesthesia Post Evaluation    Patient location during evaluation: Bedside  Patient participation: Able to fully participate in evaluation  Level of consciousness: awake and alert  Pain management: adequate  Airway patency: patent  Cardiovascular status: acceptable and hemodynamically stable  Respiratory status: acceptable and room air  Hydration status: acceptable  PONV: none     Anesthetic complications: None          Last vitals:  Vitals:    03/07/17 1330 03/07/17 1355 03/07/17 1420   BP: (!) 131/98 134/80 129/71   Pulse: 62 75 79   Resp: 16 16 16   Temp:      SpO2:  94% 94%         Electronically Signed By: SUKHDEEP Garcia CRNA  March 7, 2017  2:41 PM

## 2017-03-07 NOTE — PLAN OF CARE
Problem: Pain, Acute (Adult)  Goal: Identify Related Risk Factors and Signs and Symptoms  Related risk factors and signs and symptoms are identified upon initiation of Human Response Clinical Practice Guideline (CPG)   Outcome: Improving  Patient has  [place] to Observation  order. Patient has been given Patient Bill of Rights, Observation brochure and  What does Observation mean to me  forms.  Patient has been given the opportunity to ask questions about observation status and their plan of care.  Patient has been oriented to the observation room, bathroom, and call light is in place.  Patient voided 300 ml clear yellow urine. Patient is alert and oriented and steady on feet.  I did activate alarm at 0500 after I gave patient 0.5 mg dilaudid IV.  Patient's o2 status at that time while sleepy was 88%.  O2 placed on patient per nasal canula at 2 liters.  Sats increased to 92 %.  Left on while sleeping.  Patient denies any sores or skin conditions.  Right foot has a scar from a foot surgery 4 months ago.  Same right foot is edematous +3.  Patient states he just had an ultrasound of right extremity to check his circulation.  Patient denies any numbness or tingling in feet.  Pedal pulses normal Times both feet.  Patient to have a urology consult today for kidney stone 9 mm.  Straining urine, though he was told that it is unlikely a stone of his size will pass.

## 2017-03-07 NOTE — ANESTHESIA PREPROCEDURE EVALUATION
Anesthesia Evaluation     . Pt has had prior anesthetic. Type: MAC and General      ROS/MED HX    ENT/Pulmonary:     (+)tobacco use, Current use , . .    Neurologic:     (+)migraines,     Cardiovascular:     (+) Dyslipidemia, hypertension----. : . CHF etiology: Systolic heart failure . . :. dysrhythmias a-fib, valvular problems/murmurs type: MR . Previous cardiac testing date:results:date: results:ECG reviewed date:3/7/17 results:A-fib date: results:         Orthopnea: Systolic heart failure.   METS/Exercise Tolerance:     Hematologic:  - neg hematologic  ROS       Musculoskeletal:  - neg musculoskeletal ROS       GI/Hepatic:  - neg GI/hepatic ROS       Renal/Genitourinary:     (+) chronic renal disease, Nephrolithiasis ,       Endo:         Psychiatric:  - neg psychiatric ROS       Infectious Disease:         Malignancy:      - no malignancy   Other:    - neg other ROS           Physical Exam  Normal systems: dental    Airway   Mallampati: II  TM distance: >3 FB  Neck ROM: full    Dental     Cardiovascular   Rhythm and rate: irregular      Pulmonary                     Anesthesia Plan      History & Physical Review  History and physical reviewed and following examination; no interval change.    ASA Status:  3 .    NPO Status:  > 8 hours    Plan for MAC with Intravenous and Propofol induction. Maintenance will be Balanced.  Reason for MAC:  Deep or markedly invasive procedure (G8)  PONV prophylaxis:  Ondansetron (or other 5HT-3) and Dexamethasone or Solumedrol       Postoperative Care  Postoperative pain management:  IV analgesics, Oral pain medications and Multi-modal analgesia.      Consents  Anesthetic plan, risks, benefits and alternatives discussed with:  Patient..                          .

## 2017-03-07 NOTE — PROGRESS NOTES
[facility] TRANSPORT NOTE  Data:   Reason for Transport:  Return from surgery    Jeff Vanegas was transported to 2300 via cart at 1345.  Patient was accompanied by Nursing Assistant. Equipment used for transport: Oxygen  Nasal Cannula. Family was aware of reason for transport: yes    Action:  Report: received from MARILIA GilesU RN    Response:  Patient's condition upon return was stable.    Patient denies pain upon return from surgery    Anyi Tariq

## 2017-03-07 NOTE — PROGRESS NOTES
[facility] DISCHARGE NOTE    Patient discharged to home at 4:35 PM via wheel chair. Accompanied by spouse and staff. Discharge instructions reviewed with patient and spouse, opportunity offered to ask questions. Prescriptions filled and sent with patient upon discharge. All belongings sent with patient.    Perla Quinn RN

## 2017-03-07 NOTE — ED NOTES
Pain started about supper time, on right side, unable to sleep, woke wife at midnight, stated pain was worse.

## 2017-03-07 NOTE — PROGRESS NOTES
[facility]    Hospital Medicine   Care Update  Date of Service: 3/7/2017     S/p stent placement, vitals have now stabilized.  Sating 93% on room air with ambulation.  Voiding freely.  Tolerating regular diet.  Patient expresses desire to go home and feels safe to do so.  Wife is at bedside and agrees.      Plan: discharge to home.    Given instructions to return to ED should significant pain recur, he develop chest pain, SOB, or new onset fever/chills.      Norma Ramos PA-C

## 2017-03-07 NOTE — PHARMACY-ANTICOAGULATION SERVICE
Clinical Pharmacy - Warfarin Dosing Consult     Pharmacy has been consulted to manage this patient s warfarin therapy.  Indication: Atrial Fibrillation  Therapy Goal: INR 2-3  Provider/Team: [facility]: [facility]  Warfarin Prior to Admission: Yes  Warfarin PTA Regimen: 2.5 mg daily    INR   Date Value Ref Range Status   03/07/2017 3.01 (H) 0.86 - 1.14 Final   11/06/2016 1.08 0.86 - 1.14 Final       Recommend patient to resume warfarin 2.5 mg daily.  Patient may have INR rechecked with [facility] outpatient as previously planned prior to this admission.    Please contact pharmacy as soon as possible if the warfarin needs to be held for a procedure or if the warfarin goals change.      Lucía PerezD.

## 2017-03-07 NOTE — ED NOTES
Patient has  [place] to Observation  order. Patient has been given Observation brochure and  What does Observation mean to me  forms.  Patient has been given the opportunity to ask questions about observation status and their plan of care.      Anita Walter

## 2017-03-07 NOTE — IP AVS SNAPSHOT
[facility]    [address] [address]    Phone:  [phone]    Fax:  [phone]                                       After Visit Summary   3/7/2017    Jeff Vanegas    MRN: [id number]           After Visit Summary Signature Page     I have received my discharge instructions, and my questions have been answered. I have discussed any challenges I see with this plan with the nurse or doctor.    ..........................................................................................................................................  Patient/Patient Representative Signature      ..........................................................................................................................................  Patient Representative Print Name and Relationship to Patient    ..................................................               ................................................  Date                                            Time    ..........................................................................................................................................  Reviewed by Signature/Title    ...................................................              ..............................................  Date                                                            Time

## 2017-03-07 NOTE — ANESTHESIA CARE TRANSFER NOTE
Patient: Jeff Vanegas    Procedure(s):  Cysto, ureteroscopy right stent placement - Wound Class: II-Clean Contaminated    Diagnosis: Right ureteral stone  Diagnosis Additional Information: No value filed.    Anesthesia Type:   General     Note:  Airway :Face Mask  Patient transferred to:PACU        Vitals: (Last set prior to Anesthesia Care Transfer)    CRNA VITALS  3/7/2017 1227 - 3/7/2017 1301      3/7/2017             Resp Rate (observed): (!)  1                Electronically Signed By: SUKHDEEP Owens CRNA  March 7, 2017  1:01 PM

## 2017-03-07 NOTE — DISCHARGE INSTRUCTIONS
Please follow up with Dr. Acosta (or one of his partners) as instructed.    We have given you 20 tablets of Norco (a stronger pain medicine than tylenol or ibuprofen).  You may take 1-2 tablets every 4 hours for severe pain.  Please do not take more than 8 tabs in a 24 hour period.    Please call you PCP or return to the ED should you develop significant pain, fever, difficulty breathing, or chest pain.

## 2017-03-07 NOTE — PROGRESS NOTES
[facility] TRANSPORT NOTE  Data:   Reason for Transport:  Surgery    Jeff Vanegas was transported to [facility] Room 4 via wheel chair at 1130.  Patient was accompanied by Nursing Assistant. Equipment used for transport: None. Family was aware of reason for transport: yes    Action:  Report: given to JEAN Neil    Response:  Patient's condition when transferred off unit was stable.    Anyi Tariq

## 2017-03-07 NOTE — H&P
[facility]    History and Physical  Hospital Medicine       Date of Admission:  3/7/2017  Date of Service: 3/7/2017     Assessment & Plan   Jeff Vanegas is a 66 year old male with PMH significant for atrial fibrillation, HTN, BPH, and hx of bladder cancer s/p removal of polyp who now presents with RLQ pain with radiation to the pelvis      Nephrolithiasis  Helical CT revealed 9mm right ureteropelvic stone on 03/06. UA is negative for infection.  - NPO  - vitals Q4, assessment of infection  - urine culture pending  - pain control with dilaudid, Toradol, acetaminophen  - nausea control with Zofran, compazine  - continue fluids at 100mL/hr  - initiate Flomax 0.4mg PO, once daily  - IP urology consult placed; appreciate recommendations  - KUB ordered this AM, assessment of stone placement for possible lithotripsy      Hypokalemia  - potassium protocol in place    Atrial fibrillation (H)  INR 3.01 on admission.    - continue PTA warfarin; this was discussed with urology who states that this is acceptable pending possible stent placement later this afternoon    Essential hypertension  - continue PTA losartan, amlodipine, atenolol, chlorthalidone    Tobacco use  Minimal use over the last four months.  Reports 1 cigar a month.  Reports that he previously smoked 1 cigar per day for a period of 40 years.      F: 100mL/hr 0.9NS  E: BMP in AM  N: NPO  DVT Prophylaxis: warfarin    Code Status: Full Code    Disposition: Anticipate discharge in 1-2 days. Appropriate for observation care.    Case discussed with Dr. Scott Thompson.  Assessment and plan as written above.    Norma Ramos PA-C  [facility] Hospitalist Program    History is obtained from the patient and review of the EMR.    Past Medical History    Past Medical History   Diagnosis Date     Calculus of kidney 12/12/2006     Overview:  Hx of Lithotripsy at [facility]        Past Surgical History   Past Surgical History   Procedure Laterality  "Date     Colonoscopy N/A 4/9/2015     Procedure: COLONOSCOPY;  Surgeon: Thompson Bernardo MD;  Location: [facility]     Lithotripsy  1992     Tibialis posterior tendon repair Right 11/2016     ------------other-------------  2016     transurethral bladder tumor with stenting       Family History    Family History   Problem Relation Age of Onset     Colon Cancer Father      Type 2 Diabetes Mother        History of Present Illness   Jeff Vanegas is a 66 year old male who now presents with right lower quadrant pain radiation to right pelvis.  The patient reports he had a \"twinge\" of pain yesterday afternoon in his left upper quadrant.  He attributed this to recent foot surgery and relatively immobility s/p surgical fixation of his right ankle tendon.  Around dinner time (6pm) he noted moderate right upper quadrant pain.  This was initially non-radiating.  He reports associated nausea.  He reports that pain was moderate at first but greatly worsened over the next 4 hours.  The pain was constant.  He rates it as a 9-10 out of 10.  He reports it felt as though he had a \"softball\" stuck in his right upper quadrant.  He presented to the ED around 11pm given lack of improvement to pain.  He had several episodes of gross hematuria over the last week.  He was seen by his urologist last Wednesday for evaluation of several episodes of gross hematuria over the last month.  He is not sure what was decided upon in regards to gross hematuria and we do not have records from [facility] urology.    The patient otherwise denies fever, chills, vomiting, myalgias, cold-like symptoms (congestion, pharyngitis, sinus pressure, rhinorrhea), swollen glands, headaches, lightheadedness, dizziness, chest pain, palpitations/flutters, SOB, cough, wheezes, diarrhea/constipation, dysuria, new onset frequency/urgency, joint swelling, joint pain, leg swelling, and rashes.    The patient has had 4 kidney stones in the past; the first was in 2006.  This " required lithotripsy at [facility].  Denies EtOH.  Denies daily tobacco use.      Prior to Admission Medications   Prior to Admission Medications   Prescriptions Last Dose Informant Patient Reported? Taking?   LOSARTAN POTASSIUM PO 3/6/2017 at Unknown time Self Yes Yes   Sig: Take 100 mg by mouth daily   Multiple Vitamins-Minerals (MULTIVITAMIN ADULT) TABS 3/6/2017 at Unknown time Self Yes Yes   Sig: Take 1 tablet by mouth daily   Probiotic Product (PROBIOTIC DAILY PO) 3/6/2017 at Unknown time Self Yes Yes   Sig: Take 1 capsule by mouth daily as needed   amLODIPine (NORVASC) 5 MG tablet 3/6/2017 at Unknown time Self Yes Yes   Sig: Take 5 mg by mouth   atenolol-chlorthalidone (TENORETIC) 50-25 MG per tablet 3/6/2017 at Unknown time Self Yes Yes   Sig: Take 1 tablet by mouth daily.   tamsulosin (FLOMAX) 0.4 MG 24 hr capsule 3/6/2017 at Unknown time Self Yes Yes   Sig: Take 0.4 mg by mouth daily   warfarin (COUMADIN) 2.5 MG tablet 3/6/2017 at Unknown time  No Yes   Sig: Take 1 tablet (2.5mg) daily. Recheck INR on Wed or Thursday (11/9 or 11/10).  Further warfarin dosing per [facility] anticoagulation clinic      Facility-Administered Medications: None       Allergies   Allergies   Allergen Reactions     Diatrizoate Hives     USED DURING KIDNEY SCAN  USED DURING KIDNEY SCAN       Social History   Social History     Social History     Marital status:      Spouse name: N/A     Number of children: N/A     Years of education: N/A     Occupational History     Not on file.     Social History Main Topics     Smoking status: Current Some Day Smoker     Types: Cigars     Smokeless tobacco: Not on file      Comment: couple times a week     Alcohol use 0.0 oz/week     0 Standard drinks or equivalent per week      Comment: occasional     Drug use: Not on file     Sexual activity: Not on file     Other Topics Concern     Not on file     Social History Narrative        ROS: 10 point ROS neg other than the symptoms noted  "above in the HPI.    Physical Exam     /82 (BP Location: Left arm)  Pulse 100  Temp 97.6  F (36.4  C) (Oral)  Resp 18  Ht 1.88 m (6' 2\")  Wt 97.1 kg (214 lb 1.1 oz)  SpO2 94%  BMI 27.48 kg/m2     Weight: 214 lbs 1.07 oz Body mass index is 27.48 kg/(m^2).     Constitutional: Alert and oriented x4.  Cooperative.  Appears stated age.  Appears in no acute distress.   HEENT: Oropharynx is clear and moist. No evidence of cranial trauma.  Lymph/Hematologic: No occipital, submental, submandibular, anterior or posterior cervical, or supraclavicular lymphadenopathy is appreciated.  Cardiovascular: Regular rate/ rhythm.  S1 and S2 grossly normal.  No appreciable murmur, rub, gallop.   No lower extremity edema.  Respiratory: Clear to auscultation bilaterally. Equal chest expansion.  GI: Soft, non-tender, normal bowel sounds, no hepatosplenomegaly.  Genitourinary: +CVA tenderness to right side.  Left CVA is non-tender.  Musculoskeletal: Normal muscle bulk and tone.  Skin: Warm and dry, no rashes.   Neurologic: Neck supple. Cranial nerves 3-12 are grossly intact.  is symmetric.     Data   Data reviewed today:     Recent Labs  Lab 03/07/17  0108   WBC 7.4   HGB 14.1   MCV 94      INR 3.01*      POTASSIUM 3.2*   CHLORIDE 100   CO2 30   BUN 23   CR 1.22   ANIONGAP 9   HARJINDER 8.9   *   ALBUMIN 3.5   PROTTOTAL 6.6*   BILITOTAL 0.8   ALKPHOS 102   ALT 21   AST 25   LIPASE 108   TROPI <0.015The 99th percentile for upper reference range is 0.045 ug/L.  Troponin values in the range of 0.045 - 0.120 ug/L may be associated with risks of adverse clinical events.       Recent Results (from the past 24 hour(s))   CT Abdomen Pelvis w Contrast    Narrative    CT ABDOMEN PELVIS W CONTRAST  3/7/2017 2:00 AM      HISTORY: Right lower quadrant pain.    TECHNIQUE:  CT abdomen and pelvis with intravenous contrast. Radiation  dose for this scan was reduced using automated exposure control,  adjustment of the mA and/or kV " according to patient size, or iterative  reconstruction technique. 100 mL Isovue-370.     COMPARISON: None.    FINDINGS:    Abdomen: There is atelectasis and/or scarring at the lung bases. There  are coronary artery atherosclerotic calcifications. The heart is  enlarged. Right hemidiaphragm is elevated. The liver is normal in  appearance. Gallstone in the gallbladder. 1 cm lesion in the inferior  spleen is indeterminate but may be a cyst. The pancreas and adrenal  glands are normal in appearance. There is mild dilatation of the right  renal collecting system caused by a 0.9 cm stone at the ureteropelvic  junction. There is a large cyst at the upper pole of the right kidney  measuring 9.4 cm. The left kidney is normal in appearance. There is no  abdominal or pelvic lymph node enlargement. There is atherosclerotic  calcification of the aorta and its branches. The distal aorta is  aneurysmal at 3.6 cm AP. The common iliac arteries are aneurysmal  bilaterally measuring 2.1 cm on the right and 1.9 cm on the left.    Pelvis: The prostate gland is enlarged and contains multiple  calcifications. There is a small amount of air in the urinary bladder.  There is no bowel obstruction or inflammation. No free intraperitoneal  gas or fluid. Degenerative disease in the spine and hips.      Impression    IMPRESSION:  1. There is a 0.9 cm stone at the right ureteropelvic junction causing  mild hydronephrosis.  2. Cholelithiasis.  3. Coronary artery atherosclerotic calcifications and cardiomegaly.  4. 3.6 cm abdominal aortic aneurysm. Bilateral common iliac artery  aneurysms.  5. Prostate gland enlargement.  6. Small amount of air in the urinary bladder.       No imaging reviewed today by me.    Norma Ramos PA-C  [facility]

## 2017-03-07 NOTE — ED PROVIDER NOTES
History     Chief Complaint   Patient presents with     Abdominal Pain     Woke tonight with RUQ and RLQ moving to LLQ abdominal pain.     HPI  Jeff Vanegas is a 66 year old male with past medical history which includes systolic heart failure, atrial fibrillation, essential hypertension, migraine headaches, and previous bladder cancer status post polyp resection who presents for evaluation of right lower quadrant abdominal pain. Patient explains the pain developed around 6 PM this evening, achy and constant in nature, but gradually progressed in severity and also seems to involve the left lower abdomen. He has felt nauseous. The pain but denies vomiting, diarrhea, or blood with bowel movements. Patient has a history of kidney stones and once even requiring lithotripsy. He also notes that he has seen his urologist of [facility] for a couple episodes of painless hematuria recently. Patient has been without fever/chills, chest pain, cough, shortness of breath, back pain, or genitourinary symptoms.    I have reviewed the Medications, Allergies, Past Medical and Surgical History, and Social History in the Epic system.    Patient Active Problem List   Diagnosis     Acute systolic heart failure (H)     Atrial fibrillation (H)     Elevated prostate specific antigen (PSA)     Impotence of organic origin     Essential hypertension     Migraine without status migrainosus, not intractable     Tobacco use     Depression     HLD (hyperlipidemia)     Mitral regurgitation     ACP (advance care planning)       Past Surgical History   Procedure Laterality Date     Colonoscopy N/A 4/9/2015     Procedure: COLONOSCOPY;  Surgeon: Thompson Bernardo MD;  Location: [facility]     Lithotripsy  1992     Tibialis posterior tendon repair Right 11/2016     ------------other-------------  2016     transurethral bladder tumor with stenting       Social History     Social History     Marital status:      Spouse name: N/A     Number of  "children: N/A     Years of education: N/A     Occupational History     Not on file.     Social History Main Topics     Smoking status: Current Some Day Smoker     Types: Cigars     Smokeless tobacco: Not on file      Comment: couple times a week     Alcohol use 0.0 oz/week     0 Standard drinks or equivalent per week      Comment: occasional     Drug use: Not on file     Sexual activity: Not on file     Other Topics Concern     Not on file     Social History Narrative       Family History   Problem Relation Age of Onset     Colon Cancer Father      Type 2 Diabetes Mother          There is no immunization history on file for this patient.    Review of Systems  Constitutional: Negative for fever or chills.  Respiratory: Negative for cough or shortness of breath.  Cardiovascular: Negative for chest pain.  Gastrointestinal: Positive for right lower abdominal pain with nausea. Negative for vomiting, diarrhea, constipation, or blood with bowel movements.  Genitourinary: Negative for dysuria, hematuria, or testicular pain.  Musculoskeletal: Negative for back pain or recent injuries.    All others reviewed and are negative.      Physical Exam   BP: (!) 145/103  Pulse: 100  Temp: 98.7  F (37.1  C)  Resp: 20  Height: 188 cm (6' 2\")  Weight: 95.3 kg (210 lb)  SpO2: 96 %  Physical Exam  Constitutional: Well developed, well nourished. Appears nontoxic and in no acute distress. Resting comfortably on the gurney.  Head: Normocephalic and atraumatic. Symmetrical in appearance.  Eyes: Conjunctivae are normal.  Neck: Neck supple.  Cardiovascular: No cyanosis. Irregularly irregular. No audible murmurs noted.   Respiratory: Effort normal, no respiratory distress. CTAB without diminished regions. No wheezing, rhonchi, or crackles.  Gastrointestinal: Soft, nondistended abdomen. RLQ tenderness with guarding. No rigidity or rebound tenderness. Negative for Mckenzie's sign.   Musculoskeletal: Moves all extremities spontaneously and without " complaint.  Neuro: Patient is alert and oriented.  Skin: Skin is warm and dry, not diaphoretic.  Psych: Appears to have a normal mood and affect.      ED Course     ED Course     Procedures               EKG Interpretation:      Interpreted by: Clifford Tatum  Time reviewed: Upon arrival     Symptoms at time of EKG: Abdominal pain  Rhythm: Narrow complex irregular rhythm  Rate: Normal  Axis: Normal *  Conduction: None atypical   ST Segments/ T Waves: No pathologic ST-elevations or T-wave abnormalities.  Q Waves: None  Comparison to prior: Similar morphology to previous     Clinical Impression: No sign of ischemia         Critical Care time:  none               Results for orders placed or performed during the hospital encounter of 03/07/17 (from the past 24 hour(s))   CBC with platelets differential   Result Value Ref Range    WBC 7.4 4.0 - 11.0 10e9/L    RBC Count 4.43 4.4 - 5.9 10e12/L    Hemoglobin 14.1 13.3 - 17.7 g/dL    Hematocrit 41.7 40.0 - 53.0 %    MCV 94 78 - 100 fl    MCH 31.8 26.5 - 33.0 pg    MCHC 33.8 31.5 - 36.5 g/dL    RDW 12.2 10.0 - 15.0 %    Platelet Count 210 150 - 450 10e9/L    Diff Method Automated Method     % Neutrophils 78.9 %    % Lymphocytes 10.3 %    % Monocytes 8.4 %    % Eosinophils 1.4 %    % Basophils 1.0 %    % Immature Granulocytes 0.0 %    Absolute Neutrophil 5.8 1.6 - 8.3 10e9/L    Absolute Lymphocytes 0.8 0.8 - 5.3 10e9/L    Absolute Monocytes 0.6 0.0 - 1.3 10e9/L    Absolute Eosinophils 0.1 0.0 - 0.7 10e9/L    Absolute Basophils 0.1 0.0 - 0.2 10e9/L    Abs Immature Granulocytes 0.0 0 - 0.4 10e9/L   Comprehensive metabolic panel   Result Value Ref Range    Sodium 139 133 - 144 mmol/L    Potassium 3.2 (L) 3.4 - 5.3 mmol/L    Chloride 100 94 - 109 mmol/L    Carbon Dioxide 30 20 - 32 mmol/L    Anion Gap 9 3 - 14 mmol/L    Glucose 101 (H) 70 - 99 mg/dL    Urea Nitrogen 23 7 - 30 mg/dL    Creatinine 1.22 0.66 - 1.25 mg/dL    GFR Estimate 59 (L) >60 mL/min/1.7m2    GFR Estimate If  Black 71 >60 mL/min/1.7m2    Calcium 8.9 8.5 - 10.1 mg/dL    Bilirubin Total 0.8 0.2 - 1.3 mg/dL    Albumin 3.5 3.4 - 5.0 g/dL    Protein Total 6.6 (L) 6.8 - 8.8 g/dL    Alkaline Phosphatase 102 40 - 150 U/L    ALT 21 0 - 70 U/L    AST 25 0 - 45 U/L   Lipase   Result Value Ref Range    Lipase 108 73 - 393 U/L   Troponin I   Result Value Ref Range    Troponin I ES  0.000 - 0.045 ug/L     <0.015  The 99th percentile for upper reference range is 0.045 ug/L.  Troponin values in   the range of 0.045 - 0.120 ug/L may be associated with risks of adverse   clinical events.     INR   Result Value Ref Range    INR 3.01 (H) 0.86 - 1.14   UA reflex to Microscopic   Result Value Ref Range    Color Urine Yellow     Appearance Urine Slightly Cloudy     Glucose Urine Negative NEG mg/dL    Bilirubin Urine Negative NEG    Ketones Urine 10 (A) NEG mg/dL    Specific Gravity Urine 1.014 1.003 - 1.035    Blood Urine Moderate (A) NEG    pH Urine 6.5 5.0 - 7.0 pH    Protein Albumin Urine 10 (A) NEG mg/dL    Urobilinogen mg/dL Normal 0.0 - 2.0 mg/dL    Nitrite Urine Negative NEG    Leukocyte Esterase Urine Negative NEG    Source Unspecified Urine     RBC Urine >182 (H) 0 - 2 /HPF    WBC Urine 0 0 - 2 /HPF    Mucous Urine Present (A) NEG /LPF   CT Abdomen Pelvis w Contrast    Narrative    CT ABDOMEN PELVIS W CONTRAST  3/7/2017 2:00 AM      HISTORY: Right lower quadrant pain.    TECHNIQUE:  CT abdomen and pelvis with intravenous contrast. Radiation  dose for this scan was reduced using automated exposure control,  adjustment of the mA and/or kV according to patient size, or iterative  reconstruction technique. 100 mL Isovue-370.     COMPARISON: None.    FINDINGS:    Abdomen: There is atelectasis and/or scarring at the lung bases. There  are coronary artery atherosclerotic calcifications. The heart is  enlarged. Right hemidiaphragm is elevated. The liver is normal in  appearance. Gallstone in the gallbladder. 1 cm lesion in the  inferior  spleen is indeterminate but may be a cyst. The pancreas and adrenal  glands are normal in appearance. There is mild dilatation of the right  renal collecting system caused by a 0.9 cm stone at the ureteropelvic  junction. There is a large cyst at the upper pole of the right kidney  measuring 9.4 cm. The left kidney is normal in appearance. There is no  abdominal or pelvic lymph node enlargement. There is atherosclerotic  calcification of the aorta and its branches. The distal aorta is  aneurysmal at 3.6 cm AP. The common iliac arteries are aneurysmal  bilaterally measuring 2.1 cm on the right and 1.9 cm on the left.    Pelvis: The prostate gland is enlarged and contains multiple  calcifications. There is a small amount of air in the urinary bladder.  There is no bowel obstruction or inflammation. No free intraperitoneal  gas or fluid. Degenerative disease in the spine and hips.      Impression    IMPRESSION:  1. There is a 0.9 cm stone at the right ureteropelvic junction causing  mild hydronephrosis.  2. Cholelithiasis.  3. Coronary artery atherosclerotic calcifications and cardiomegaly.  4. 3.6 cm abdominal aortic aneurysm. Bilateral common iliac artery  aneurysms.  5. Prostate gland enlargement.  6. Small amount of air in the urinary bladder.         Assessments & Plan (with Medical Decision Making)   Jeff Vanegas is a 66 year old male who presented to the department for evaluation of right lower abdominal pain which is grown severe throughout the evening. He is also noted painless hematuria over the past couple of days prior to onset of pain. He has right lower abdominal tenderness on examination, but no right upper quadrant tenderness and with negative Mckenzie sign. Urinalysis returns positive for significant amount of blood, nitrite and leukocyte esterase negative. CBC is without leukocytosis. CT scan of abdomen/pelvis has discovered a 9 mm kidney stone at the ureteropelvic junction causing  hydronephrosis. Incidental findings include 3.6 cm abdominal aortic aneurysm, common iliac artery aneurysms, and cholelithiasis. There is no evidence of gallbladder wall thickening or pericystic fluid. Hepatic enzymes, alkaline phosphatase, bilirubin and lipase also within reference range.    The patient's pain gradually improved with small dosing of IV hydromorphone, however to control his pain he also required supplemental oxygen support via nasal cannula to maintain saturations of 90%. Ketorolac was avoided because of his warfarin use. Jeff will require admission for pain control and continued monitoring. Hospitalist Dr. Garcia was consulted regarding patient's presentation, workup, and plan for observation admission. He agrees and is aware that Urologist Dr. Acosta is scheduled for clinic tomorrow, can be available for consultation. No further recommendations at this time. Temporary transition orders were placed per protocol including RUQ ultrasound to confirm asymptomatic cholelithiasis.    The patient and his cupping significant other have been informed of his results and the recommendation for admission for pain control. They have verbalized an understanding, all questions answered, and they are in agreement with the plan at this time.      Disclaimer: This note consists of symbols derived from keyboarding, dictation, and/or voice recognition software. As a result, there may be errors in the script that have gone undetected.  Please consider this when interpreting information found in the chart.          I have reviewed the nursing notes.    I have reviewed the findings, diagnosis, plan and need for follow up with the patient.    New Prescriptions    No medications on file       Final diagnoses:   RLQ abdominal pain   Kidney stone   Hydronephrosis with urinary obstruction due to renal calculus   Calculus of gallbladder without cholecystitis without obstruction   Abdominal aortic aneurysm (AAA) without  rupture (H)       3/7/2017   [facility] EMERGENCY DEPARTMENT     Clifford Tatum,   03/07/17 0328

## 2017-03-07 NOTE — CONSULTS
Chief complaint: right ureteral stone    HPI: presented to the ER with flank pain. Has a history of ureteral stones.    Currently being followed for bladder cancer.    Anticoagulated for atrial fibrillation.    Plan: will stent the right ureter for pain control and then make arrangements for stone extraction after reversal of his anticoagulant.    Total time 20 minutes.

## 2019-11-03 ENCOUNTER — HEALTH MAINTENANCE LETTER (OUTPATIENT)
Age: 69
End: 2019-11-03

## 2020-02-10 ENCOUNTER — HEALTH MAINTENANCE LETTER (OUTPATIENT)
Age: 70
End: 2020-02-10

## 2020-11-16 ENCOUNTER — HEALTH MAINTENANCE LETTER (OUTPATIENT)
Age: 70
End: 2020-11-16

## 2021-04-03 ENCOUNTER — HEALTH MAINTENANCE LETTER (OUTPATIENT)
Age: 71
End: 2021-04-03

## 2021-09-12 ENCOUNTER — HEALTH MAINTENANCE LETTER (OUTPATIENT)
Age: 71
End: 2021-09-12

## 2022-04-24 ENCOUNTER — HEALTH MAINTENANCE LETTER (OUTPATIENT)
Age: 72
End: 2022-04-24

## 2022-11-19 ENCOUNTER — HEALTH MAINTENANCE LETTER (OUTPATIENT)
Age: 72
End: 2022-11-19

## 2023-03-20 ENCOUNTER — HOSPITAL ENCOUNTER (INPATIENT)
Facility: CLINIC | Age: 73
LOS: 1 days | Discharge: HOME | DRG: 204 | End: 2023-03-22
Attending: EMERGENCY MEDICINE | Admitting: INTERNAL MEDICINE
Payer: COMMERCIAL

## 2023-03-20 ENCOUNTER — APPOINTMENT (OUTPATIENT)
Dept: GENERAL RADIOLOGY | Facility: CLINIC | Age: 73
DRG: 204 | End: 2023-03-20
Attending: FAMILY MEDICINE
Payer: COMMERCIAL

## 2023-03-20 ENCOUNTER — APPOINTMENT (OUTPATIENT)
Dept: CT IMAGING | Facility: CLINIC | Age: 73
DRG: 204 | End: 2023-03-20
Attending: EMERGENCY MEDICINE
Payer: COMMERCIAL

## 2023-03-20 DIAGNOSIS — J85.2 ABSCESS OF LOWER LOBE OF RIGHT LUNG WITHOUT PNEUMONIA (H): ICD-10-CM

## 2023-03-20 DIAGNOSIS — Z79.01 LONG TERM (CURRENT) USE OF ANTICOAGULANTS: ICD-10-CM

## 2023-03-20 DIAGNOSIS — Z79.01 CHRONIC ANTICOAGULATION: ICD-10-CM

## 2023-03-20 DIAGNOSIS — I10 ESSENTIAL HYPERTENSION, MALIGNANT: ICD-10-CM

## 2023-03-20 DIAGNOSIS — I48.91 ATRIAL FIBRILLATION, UNSPECIFIED TYPE (H): Chronic | ICD-10-CM

## 2023-03-20 DIAGNOSIS — I10 ESSENTIAL HYPERTENSION: Chronic | ICD-10-CM

## 2023-03-20 DIAGNOSIS — R09.02 HYPOXIA: ICD-10-CM

## 2023-03-20 LAB
ANION GAP SERPL CALCULATED.3IONS-SCNC: 7 MMOL/L (ref 7–15)
BASOPHILS # BLD AUTO: 0.1 10E3/UL (ref 0–0.2)
BASOPHILS NFR BLD AUTO: 1 %
BUN SERPL-MCNC: 21.6 MG/DL (ref 8–23)
CALCIUM SERPL-MCNC: 9.5 MG/DL (ref 8.8–10.2)
CHLORIDE SERPL-SCNC: 99 MMOL/L (ref 98–107)
CREAT SERPL-MCNC: 0.99 MG/DL (ref 0.67–1.17)
D DIMER PPP FEU-MCNC: 1.71 UG/ML FEU (ref 0–0.5)
DEPRECATED HCO3 PLAS-SCNC: 34 MMOL/L (ref 22–29)
EOSINOPHIL # BLD AUTO: 0.3 10E3/UL (ref 0–0.7)
EOSINOPHIL NFR BLD AUTO: 4 %
ERYTHROCYTE [DISTWIDTH] IN BLOOD BY AUTOMATED COUNT: 13 % (ref 10–15)
GFR SERPL CREATININE-BSD FRML MDRD: 80 ML/MIN/1.73M2
GLUCOSE SERPL-MCNC: 85 MG/DL (ref 70–99)
HCT VFR BLD AUTO: 38.6 % (ref 40–53)
HGB BLD-MCNC: 12.9 G/DL (ref 13.3–17.7)
HOLD SPECIMEN: NORMAL
HOLD SPECIMEN: NORMAL
IMM GRANULOCYTES # BLD: 0 10E3/UL
IMM GRANULOCYTES NFR BLD: 0 %
LYMPHOCYTES # BLD AUTO: 1.5 10E3/UL (ref 0.8–5.3)
LYMPHOCYTES NFR BLD AUTO: 26 %
MCH RBC QN AUTO: 33.2 PG (ref 26.5–33)
MCHC RBC AUTO-ENTMCNC: 33.4 G/DL (ref 31.5–36.5)
MCV RBC AUTO: 99 FL (ref 78–100)
MONOCYTES # BLD AUTO: 0.6 10E3/UL (ref 0–1.3)
MONOCYTES NFR BLD AUTO: 10 %
NEUTROPHILS # BLD AUTO: 3.4 10E3/UL (ref 1.6–8.3)
NEUTROPHILS NFR BLD AUTO: 59 %
NRBC # BLD AUTO: 0 10E3/UL
NRBC BLD AUTO-RTO: 0 /100
PLATELET # BLD AUTO: 202 10E3/UL (ref 150–450)
POTASSIUM SERPL-SCNC: 3.6 MMOL/L (ref 3.4–5.3)
RBC # BLD AUTO: 3.89 10E6/UL (ref 4.4–5.9)
SODIUM SERPL-SCNC: 140 MMOL/L (ref 136–145)
WBC # BLD AUTO: 5.9 10E3/UL (ref 4–11)

## 2023-03-20 PROCEDURE — 96365 THER/PROPH/DIAG IV INF INIT: CPT | Mod: 59 | Performed by: EMERGENCY MEDICINE

## 2023-03-20 PROCEDURE — 250N000011 HC RX IP 250 OP 636: Performed by: EMERGENCY MEDICINE

## 2023-03-20 PROCEDURE — 85379 FIBRIN DEGRADATION QUANT: CPT | Performed by: EMERGENCY MEDICINE

## 2023-03-20 PROCEDURE — 82310 ASSAY OF CALCIUM: CPT | Performed by: EMERGENCY MEDICINE

## 2023-03-20 PROCEDURE — 71046 X-RAY EXAM CHEST 2 VIEWS: CPT

## 2023-03-20 PROCEDURE — 99285 EMERGENCY DEPT VISIT HI MDM: CPT | Performed by: EMERGENCY MEDICINE

## 2023-03-20 PROCEDURE — 85025 COMPLETE CBC W/AUTO DIFF WBC: CPT | Performed by: EMERGENCY MEDICINE

## 2023-03-20 PROCEDURE — 87205 SMEAR GRAM STAIN: CPT | Performed by: EMERGENCY MEDICINE

## 2023-03-20 PROCEDURE — 36415 COLL VENOUS BLD VENIPUNCTURE: CPT | Performed by: EMERGENCY MEDICINE

## 2023-03-20 PROCEDURE — 93010 ELECTROCARDIOGRAM REPORT: CPT | Performed by: EMERGENCY MEDICINE

## 2023-03-20 PROCEDURE — 71275 CT ANGIOGRAPHY CHEST: CPT

## 2023-03-20 PROCEDURE — 99285 EMERGENCY DEPT VISIT HI MDM: CPT | Mod: 25 | Performed by: EMERGENCY MEDICINE

## 2023-03-20 PROCEDURE — 93005 ELECTROCARDIOGRAM TRACING: CPT | Performed by: EMERGENCY MEDICINE

## 2023-03-20 PROCEDURE — 250N000009 HC RX 250: Performed by: EMERGENCY MEDICINE

## 2023-03-20 RX ORDER — LOSARTAN POTASSIUM 100 MG/1
1 TABLET ORAL
Status: ON HOLD | COMMUNITY
Start: 2023-01-09 | End: 2023-03-22

## 2023-03-20 RX ORDER — IOPAMIDOL 755 MG/ML
78 INJECTION, SOLUTION INTRAVASCULAR ONCE
Status: COMPLETED | OUTPATIENT
Start: 2023-03-20 | End: 2023-03-20

## 2023-03-20 RX ORDER — POTASSIUM CITRATE 10 MEQ/1
TABLET, EXTENDED RELEASE ORAL
COMMUNITY

## 2023-03-20 RX ADMIN — IOPAMIDOL 78 ML: 755 INJECTION, SOLUTION INTRAVENOUS at 21:06

## 2023-03-20 RX ADMIN — SODIUM CHLORIDE 100 ML: 9 INJECTION, SOLUTION INTRAVENOUS at 21:07

## 2023-03-20 ASSESSMENT — ACTIVITIES OF DAILY LIVING (ADL)
ADLS_ACUITY_SCORE: 35
ADLS_ACUITY_SCORE: 35

## 2023-03-20 NOTE — ED TRIAGE NOTES
Productive cough, blood tinged sputum, sent from      Triage Assessment     Row Name 03/20/23 8625       Triage Assessment (Adult)    Airway WDL WDL       Respiratory WDL    Respiratory WDL X       Skin Circulation/Temperature WDL    Skin Circulation/Temperature WDL WDL       Cardiac WDL    Cardiac WDL WDL       Peripheral/Neurovascular WDL    Peripheral Neurovascular WDL WDL       Cognitive/Neuro/Behavioral WDL    Cognitive/Neuro/Behavioral WDL WDL

## 2023-03-20 NOTE — ED TRIAGE NOTES
Triage Assessment     Row Name 03/20/23 1514       Triage Assessment (Adult)    Airway WDL WDL       Respiratory WDL    Respiratory WDL X       Skin Circulation/Temperature WDL    Skin Circulation/Temperature WDL WDL       Cardiac WDL    Cardiac WDL WDL       Peripheral/Neurovascular WDL    Peripheral Neurovascular WDL WDL       Cognitive/Neuro/Behavioral WDL    Cognitive/Neuro/Behavioral WDL WDL

## 2023-03-21 PROBLEM — I48.91 ATRIAL FIBRILLATION, UNSPECIFIED TYPE (H): Status: ACTIVE | Noted: 2023-03-21

## 2023-03-21 PROBLEM — Z79.01 CHRONIC ANTICOAGULATION: Status: ACTIVE | Noted: 2023-03-21

## 2023-03-21 PROBLEM — J85.2: Status: ACTIVE | Noted: 2023-03-21

## 2023-03-21 LAB
ERYTHROCYTE [DISTWIDTH] IN BLOOD BY AUTOMATED COUNT: 12.5 % (ref 10–15)
HCT VFR BLD AUTO: 36.8 % (ref 40–53)
HGB BLD-MCNC: 12.4 G/DL (ref 13.3–17.7)
HOLD SPECIMEN: NORMAL
MCH RBC QN AUTO: 33.2 PG (ref 26.5–33)
MCHC RBC AUTO-ENTMCNC: 33.7 G/DL (ref 31.5–36.5)
MCV RBC AUTO: 98 FL (ref 78–100)
MRSA DNA SPEC QL NAA+PROBE: NEGATIVE
PLATELET # BLD AUTO: 188 10E3/UL (ref 150–450)
RBC # BLD AUTO: 3.74 10E6/UL (ref 4.4–5.9)
S PNEUM AG SPEC QL: NEGATIVE
SA TARGET DNA: POSITIVE
WBC # BLD AUTO: 6 10E3/UL (ref 4–11)

## 2023-03-21 PROCEDURE — 120N000001 HC R&B MED SURG/OB

## 2023-03-21 PROCEDURE — 250N000011 HC RX IP 250 OP 636: Performed by: EMERGENCY MEDICINE

## 2023-03-21 PROCEDURE — 85027 COMPLETE CBC AUTOMATED: CPT | Performed by: INTERNAL MEDICINE

## 2023-03-21 PROCEDURE — 99223 1ST HOSP IP/OBS HIGH 75: CPT | Mod: AI | Performed by: INTERNAL MEDICINE

## 2023-03-21 PROCEDURE — 87899 AGENT NOS ASSAY W/OPTIC: CPT | Performed by: INTERNAL MEDICINE

## 2023-03-21 PROCEDURE — 87641 MR-STAPH DNA AMP PROBE: CPT | Performed by: INTERNAL MEDICINE

## 2023-03-21 PROCEDURE — 250N000013 HC RX MED GY IP 250 OP 250 PS 637: Performed by: EMERGENCY MEDICINE

## 2023-03-21 PROCEDURE — 250N000011 HC RX IP 250 OP 636: Performed by: INTERNAL MEDICINE

## 2023-03-21 PROCEDURE — 36415 COLL VENOUS BLD VENIPUNCTURE: CPT | Performed by: INTERNAL MEDICINE

## 2023-03-21 RX ORDER — LIDOCAINE 40 MG/G
CREAM TOPICAL
Status: DISCONTINUED | OUTPATIENT
Start: 2023-03-21 | End: 2023-03-22 | Stop reason: HOSPADM

## 2023-03-21 RX ORDER — ONDANSETRON 2 MG/ML
4 INJECTION INTRAMUSCULAR; INTRAVENOUS EVERY 6 HOURS PRN
Status: DISCONTINUED | OUTPATIENT
Start: 2023-03-21 | End: 2023-03-22 | Stop reason: HOSPADM

## 2023-03-21 RX ORDER — ONDANSETRON 4 MG/1
4 TABLET, ORALLY DISINTEGRATING ORAL EVERY 6 HOURS PRN
Status: DISCONTINUED | OUTPATIENT
Start: 2023-03-21 | End: 2023-03-22 | Stop reason: HOSPADM

## 2023-03-21 RX ORDER — LOSARTAN POTASSIUM 50 MG/1
100 TABLET ORAL DAILY
Status: DISCONTINUED | OUTPATIENT
Start: 2023-03-21 | End: 2023-03-22 | Stop reason: HOSPADM

## 2023-03-21 RX ORDER — TAMSULOSIN HYDROCHLORIDE 0.4 MG/1
0.4 CAPSULE ORAL EVERY EVENING
Status: DISCONTINUED | OUTPATIENT
Start: 2023-03-21 | End: 2023-03-22 | Stop reason: HOSPADM

## 2023-03-21 RX ORDER — ACETAMINOPHEN 325 MG/1
650 TABLET ORAL EVERY 4 HOURS PRN
Status: DISCONTINUED | OUTPATIENT
Start: 2023-03-21 | End: 2023-03-22 | Stop reason: HOSPADM

## 2023-03-21 RX ORDER — AMLODIPINE BESYLATE 5 MG/1
5 TABLET ORAL DAILY
Status: DISCONTINUED | OUTPATIENT
Start: 2023-03-21 | End: 2023-03-22 | Stop reason: HOSPADM

## 2023-03-21 RX ORDER — ACETAMINOPHEN 325 MG/1
650 TABLET ORAL EVERY 6 HOURS PRN
Status: DISCONTINUED | OUTPATIENT
Start: 2023-03-21 | End: 2023-03-22 | Stop reason: HOSPADM

## 2023-03-21 RX ORDER — VANCOMYCIN HYDROCHLORIDE 1 G/200ML
1000 INJECTION, SOLUTION INTRAVENOUS EVERY 12 HOURS
Status: DISCONTINUED | OUTPATIENT
Start: 2023-03-21 | End: 2023-03-22 | Stop reason: HOSPADM

## 2023-03-21 RX ORDER — TAMSULOSIN HYDROCHLORIDE 0.4 MG/1
0.4 CAPSULE ORAL EVERY EVENING
Status: DISCONTINUED | OUTPATIENT
Start: 2023-03-21 | End: 2023-03-21

## 2023-03-21 RX ADMIN — TAZOBACTAM SODIUM AND PIPERACILLIN SODIUM 3.38 G: 375; 3 INJECTION, SOLUTION INTRAVENOUS at 23:54

## 2023-03-21 RX ADMIN — TAZOBACTAM SODIUM AND PIPERACILLIN SODIUM 3.38 G: 375; 3 INJECTION, SOLUTION INTRAVENOUS at 00:07

## 2023-03-21 RX ADMIN — TAZOBACTAM SODIUM AND PIPERACILLIN SODIUM 3.38 G: 375; 3 INJECTION, SOLUTION INTRAVENOUS at 18:02

## 2023-03-21 RX ADMIN — LOSARTAN POTASSIUM 100 MG: 50 TABLET, FILM COATED ORAL at 07:38

## 2023-03-21 RX ADMIN — CHLORTHALIDONE: 25 TABLET ORAL at 07:38

## 2023-03-21 RX ADMIN — TAZOBACTAM SODIUM AND PIPERACILLIN SODIUM 3.38 G: 375; 3 INJECTION, SOLUTION INTRAVENOUS at 11:55

## 2023-03-21 RX ADMIN — TAMSULOSIN HYDROCHLORIDE 0.4 MG: 0.4 CAPSULE ORAL at 01:22

## 2023-03-21 RX ADMIN — VANCOMYCIN HYDROCHLORIDE 1000 MG: 1 INJECTION, SOLUTION INTRAVENOUS at 20:47

## 2023-03-21 RX ADMIN — TAZOBACTAM SODIUM AND PIPERACILLIN SODIUM 3.38 G: 375; 3 INJECTION, SOLUTION INTRAVENOUS at 05:43

## 2023-03-21 RX ADMIN — TAMSULOSIN HYDROCHLORIDE 0.4 MG: 0.4 CAPSULE ORAL at 18:02

## 2023-03-21 RX ADMIN — AMLODIPINE BESYLATE 5 MG: 5 TABLET ORAL at 07:38

## 2023-03-21 RX ADMIN — VANCOMYCIN HYDROCHLORIDE 1000 MG: 1 INJECTION, SOLUTION INTRAVENOUS at 09:30

## 2023-03-21 ASSESSMENT — ACTIVITIES OF DAILY LIVING (ADL)
ADLS_ACUITY_SCORE: 20
DRESSING/BATHING_DIFFICULTY: NO
DIFFICULTY_EATING/SWALLOWING: NO
ADLS_ACUITY_SCORE: 20
CHANGE_IN_FUNCTIONAL_STATUS_SINCE_ONSET_OF_CURRENT_ILLNESS/INJURY: NO
ADLS_ACUITY_SCORE: 20
ADLS_ACUITY_SCORE: 35
ADLS_ACUITY_SCORE: 20
DOING_ERRANDS_INDEPENDENTLY_DIFFICULTY: NO
WALKING_OR_CLIMBING_STAIRS_DIFFICULTY: NO
VISION_MANAGEMENT: GLASSES
FALL_HISTORY_WITHIN_LAST_SIX_MONTHS: NO
ADLS_ACUITY_SCORE: 20
WEAR_GLASSES_OR_BLIND: YES
ADLS_ACUITY_SCORE: 35
ADLS_ACUITY_SCORE: 20
ADLS_ACUITY_SCORE: 20
ADLS_ACUITY_SCORE: 35
ADLS_ACUITY_SCORE: 35
ADLS_ACUITY_SCORE: 20
CONCENTRATING,_REMEMBERING_OR_MAKING_DECISIONS_DIFFICULTY: NO
TOILETING_ISSUES: NO

## 2023-03-21 NOTE — PROGRESS NOTES
Pulmonary Telephone Call    I was called by the ED to discuss Mr Vanegas.  He is in the emergency department after noting a small amount of blood in his sputum. He is on apixaban.      Chest CT shows a RLL consolidation and likely abscess.  No pleural fluid.  No mediastinal LN.  The ED question for me was whether a bronchoscopy or IR embolization was needed at this time.  I do not believe either or these procedures are indicated at the moment. This is not massive hemoptysis. Bronchoscopy may be helpful to diagnose a lung cancer, but at this point I would treat with antibiotics and monitor his clinical trajectory. A bronchoscopy has no role to stop this reported amount of hemoptysis.  I suggested an antibiotic such as zosyn or unasyn during his admission.  Sputum cultures. Hold his apixaban for now    Yemi Miller MD

## 2023-03-21 NOTE — H&P
[facility]    History and Physical - Hospitalist Service       Date of Admission:  3/20/2023    Assessment & Plan   Hemoptysis due to RLL cavitary consolidation/pulmonary abscess  -Hgb 12.9 ; repeat CBC  -cont Zosyn; add Vanco  -sputum culture, Strep Ag  -wean O2 as tolerated  -case reviewed by Pulm-ICU provider remotely; no need to transfer at this time  -hold Eliquis  -CTA chest: no PE    HTN  -cont atenolol, chlorthalidone, losartan    BPH  -cont tamsulosin    afib  -hold Eliquis  -cont atenolol     Diet: Low Fat Diet    DVT Prophylaxis:scds   Silva Catheter: Not present  Lines: None     Code Status:  FULL    Clinically Significant Risk Factors Present on Admission     # Drug Induced Coagulation Defect: home medication list includes an anticoagulant medication    # Hypertension: home medication list includes antihypertensive(s)   # Acute Respiratory Failure: Documented O2 saturation < 91%.  Continue supplemental oxygen as needed             Disposition Plan      Expected Discharge Date: 03/23/2023                The patient's care was discussed with the patient.        JAIRO CHANDLER MD  [facility]  Securely message with the Vocera Web Console (learn more here)  Text page via [employer] Paging/Directory      Visit/Communication Style   Virtual (Video) communication was used to evaluate Jeff.  Jeff consented to the use of video communication: yes  Video START time: 0622, 3/21/2023  Video STOP time: 0630 , 3/21/2023   Patient's location: [facility]   Provider's location during the visit: remote [place] [facility] site        ______________________________________________________________________    Chief Complaint   hemoptysis    History of Present Illness    72yoM with afib on Eliquis, HTN, BPH, COPD, and bladder CA s/p TURBT (2016) presented to the ED with cough productive of bloody mucus and CEJA for the last four days.  He  denies URI symptoms, fever, chills, chest pain, abdominal pain, n/v/d.    Review of Systems    General: negative for fever, chills, sweats, weakness  Eyes: negative for blurred vision, loss of vision  Ear Nose and Throat: negative for pharyngitis, speech or swallowing difficulties  Respiratory:  negative for , wheezing, pleuritic pain,  Cardiology:  negative for chest pain, palpitations, orthopnea, PND, edema, syncope   Gastrointestinal: negative for abdominal pain, nausea, vomiting, diarrhea, constipation, hematemesis, melena or hematochezia  Genitourinary: negative for frequency, urgency, dysuria, hematuria   Neurological: negative for focal weakness, paresthesia    Past Medical History    I have reviewed this patient's medical history and updated it with pertinent information if needed.   Past Medical History:   Diagnosis Date     Calculus of kidney 12/12/2006    Overview:  Hx of Lithotripsy at [facility]        Past Surgical History   I have reviewed this patient's surgical history and updated it with pertinent information if needed.  Past Surgical History:   Procedure Laterality Date     ------------OTHER-------------  2016    transurethral bladder tumor with stenting     COLONOSCOPY N/A 4/9/2015    Procedure: COLONOSCOPY;  Surgeon: Thompson Bernardo MD;  Location: [facility]     CYSTOSCOPY, RETROGRADES, INSERT STENT URETER(S), COMBINED Right 3/7/2017    Procedure: COMBINED CYSTOSCOPY, RETROGRADES, INSERT STENT URETER(S);  Surgeon: DUY Acosta MD;  Location: [facility]     LITHOTRIPSY  1992     Tibialis Posterior Tendon Repair Right 11/2016       Social History   I have reviewed this patient's social history and updated it with pertinent information if needed.  Social History     Tobacco Use     Smoking status: Some Days     Types: Cigars     Tobacco comments:     couple times a week   Substance Use Topics     Alcohol use: Yes     Alcohol/week: 0.0 standard drinks     Comment: occasional       Family History   I have  reviewed this patient's family history and updated it with pertinent information if needed.  Family History   Problem Relation Age of Onset     Colon Cancer Father      Diabetes Type 2  Mother        Prior to Admission Medications   Prior to Admission Medications   Prescriptions Last Dose Informant Patient Reported? Taking?   HYDROcodone-acetaminophen (NORCO) 5-325 MG per tablet   No No   Sig: Take 1-2 tablets by mouth every 4 hours as needed for moderate to severe pain maximum 8 tablet(s) per day   LOSARTAN POTASSIUM PO  Self Yes No   Sig: Take 100 mg by mouth daily   Multiple Vitamins-Minerals (MULTIVITAMIN ADULT) TABS  Self Yes No   Sig: Take 1 tablet by mouth daily   Probiotic Product (PROBIOTIC DAILY PO) 3/19/2023 at 2200 Self Yes Yes   Sig: Take 1 capsule by mouth daily    VITAMIN D, CHOLECALCIFEROL, PO  Self Yes No   Sig: Take 1 tablet by mouth daily   amLODIPine (NORVASC) 5 MG tablet 3/20/2023 at 0800 Self Yes Yes   Sig: Take 5 mg by mouth daily    apixaban ANTICOAGULANT (ELIQUIS) 2.5 MG tablet 3/20/2023 at 0800  Yes Yes   Sig: Eliquis 2.5 mg tablet   Take 1 tablet twice a day by oral route.   aspirin (ASA) 81 MG EC tablet   Yes No   Sig: Take 81 mg by mouth   atenolol-chlorthalidone (TENORETIC) 50-25 MG per tablet 3/20/2023 at 0800 Self Yes Yes   Sig: Take 1 tablet by mouth daily.   losartan (COZAAR) 100 MG tablet 3/20/2023 at 0800  Yes Yes   Sig: Take 1 tablet by mouth daily at 2 pm   potassium citrate (UROCIT-K) 10 MEQ (1080 MG) CR tablet 3/19/2023 at 2200  Yes Yes   Sig: potassium citrate ER 10 mEq (1,080 mg) tablet,extended release   Take 1 tablet by mouth twice daily   tamsulosin (FLOMAX) 0.4 MG capsule 3/19/2023 at 2200 Self Yes Yes   Sig: Take 0.4 mg by mouth every evening       Facility-Administered Medications: None     Allergies   Allergies   Allergen Reactions     Diatrizoate Hives     IV dye USED DURING KIDNEY SCAN         Physical Exam   Vital Signs: Temp: 97.8  F (36.6  C)   BP: (!) 129/94  Pulse: 82   Resp: 18 SpO2: 95 % O2 Device: Nasal cannula Oxygen Delivery: 2 LPM  Weight: 214 lbs 0 oz    Parts of exam taken from ED note,  unable to auscultate  Gen:  Well-developed, well-nourished, in no acute distress, lying semi-supine in hospital stretcher  HEENT:  Anicteric sclera, PER, hearing intact to voice  Resp:  No accessory muscle use, breath sounds clear; no wheezes no rales no rhonchi  Card:  No murmur, normal S1, S2   Abd:  Soft per RN exam, no TTP, non-distended, normoactive bowel sounds are present  Musc:  Normal strength and movement of the major muscle groups without obvious deformity  Psych:  Good insight, oriented to person, place and time, not anxious, not agitated    Data     Recent Labs   Lab 03/20/23 2001   WBC 5.9   HGB 12.9*   MCV 99         POTASSIUM 3.6   CHLORIDE 99   CO2 34*   BUN 21.6   CR 0.99   ANIONGAP 7   HARJINDER 9.5   GLC 85         Recent Results (from the past 24 hour(s))   Chest XR,  PA & LAT    Narrative    EXAM: XR CHEST 2 VIEWS  LOCATION: [facility]  DATE/TIME: 3/20/2023 6:51 PM    INDICATION: hemoptysis  COMPARISON: CT chest dated 11/06/2016      Impression    IMPRESSION: Heart size is enlarged but stable. Tortuous thoracic aorta. Increasing pulmonary parenchymal opacity in the right lower lobe and in the cardiophrenic angle, atelectasis versus infiltrate. Large hiatal hernia with air-fluid level suspected on   the lateral view. Given the history of hemoptysis, consider further evaluation of the above findings with CT.   CT Chest Pulmonary Embolism w Contrast    Narrative    EXAM: CT CHEST PULMONARY EMBOLISM W CONTRAST  LOCATION: [facility]  DATE/TIME: 3/20/2023 9:25 PM    INDICATION: 4 days hemoptysis, on apixaban for Afib, elevated dimer  COMPARISON: None.  TECHNIQUE: CT chest pulmonary angiogram during arterial phase injection of IV contrast. Multiplanar reformats and MIP reconstructions were performed.  Dose reduction techniques were used.   CONTRAST: 78 mL Isovue 370    FINDINGS:  ANGIOGRAM CHEST: Dilated main pulmonary artery measuring 4.3 cm in diameter. There are no left lung, right upper lobe, or right middle lobe pulmonary artery filling defects. Evaluation of the subsegmental pulmonary arteries in the right lower lobe is   limited due to suboptimal opacification due to slow flow. Fusiform enlargement of the entire thoracic aorta with mid ascending aorta measuring 4.8 cm in diameter.    LUNGS AND PLEURA: Cavitary consolidation is present in the posterior right lower lobe with air-fluid level, measuring around 5.3 x 8.9 cm (series 6, image 149). There is mixed consolidation and groundglass attenuation in the surrounding parenchyma   consistent with inflammation and/or hemorrhage. Upper lung predominant emphysema. Anatomic variant with azygos fissure. Segmental atelectasis of the lateral segment right middle lobe. No pleural effusions.    MEDIASTINUM: Massive enlargement of the left atrium which measures greater than 12 cm transverse. Moderate enlargement of the right atrium and right ventricle. No pericardial effusion. Esophagus is predominantly decompressed. Imaged thyroid gland is   normal.    CORONARY ARTERY CALCIFICATION: Moderate.    UPPER ABDOMEN: Cholelithiasis. Simple fluid attenuation cyst arising from the upper right kidney does not require specific workup or follow-up. The imaged upper abdominal aorta has patchy atheromatous calcification and measures up to 3.7 cm at the level   of the mesenteric vessel origins.    MUSCULOSKELETAL: Disc space narrowing and osteophytes of the thoracic spine but no focal compression deformities.      Impression    IMPRESSION:    1.  The subsegmental pulmonary arteries in the right lower lobe are not well opacified. No acute pulmonary embolism in the left lung, right upper, or right middle lobes.  2.  Cavitary consolidation/abscess in the posterior right lower lobe  with surrounding mixed consolidative and groundglass attenuation in either inflammation/infection and/or pulmonary hemorrhage.  3.  Severely dilated left atrium, moderately dilated pulmonary arteries and right heart chambers consistent with group 2 pulmonary hypertension.  4.  Emphysema.

## 2023-03-21 NOTE — ED NOTES
[facility]   Admission Handoff    The patient is Jeff Vanegas, 72 year old who arrived in the ED by WALKED from home with a complaint of Hemoptysis  . The patient's current symptoms are new and during this time the symptoms have decreased. In the ED, patient was diagnosed with   Final diagnoses:   Abscess of lower lobe of right lung without pneumonia (H)   Atrial fibrillation, unspecified type (H)   Essential hypertension   Chronic anticoagulation         Needed?: No    Allergies:    Allergies   Allergen Reactions    Diatrizoate Hives     IV dye USED DURING KIDNEY SCAN         Past Medical Hx:   Past Medical History:   Diagnosis Date    Calculus of kidney 12/12/2006    Overview:  Hx of Lithotripsy at [facility]        Initial vitals were: BP: 100/64  Pulse: 87  Temp: 97.8  F (36.6  C)  Resp: 18  Weight: 97.1 kg (214 lb)  SpO2: 93 %   Recent vital Signs: BP (!) 120/93   Pulse 82   Temp 97.8  F (36.6  C)   Resp 18   Wt 97.1 kg (214 lb)   SpO2 96%   BMI 27.48 kg/m      Elimination Status: Continent: Yes     Activity Level: Independent    Fall Status:        Baseline Mental status: WDL  Current Mental Status changes: at basesline    Infection present or suspected this encounter: no  Sepsis suspected: No    Isolation type: none    Bariatric equipment needed?: No    In the ED these meds were given:   Medications   piperacillin-tazobactam (ZOSYN) infusion 3.375 g (0 g Intravenous Stopped 3/21/23 0037)   amLODIPine (NORVASC) tablet 5 mg (has no administration in time range)   atenolol-chlorthalidone (TENORETIC) 50-25 mg combo dose (has no administration in time range)   losartan (COZAAR) tablet 100 mg (has no administration in time range)   tamsulosin (FLOMAX) capsule 0.4 mg (0.4 mg Oral $Given 3/21/23 0122)   iopamidol (ISOVUE-370) solution 78 mL (78 mLs Intravenous $Given 3/20/23 2106)   sodium chloride 0.9 % bag 500mL for CT scan flush use (100 mLs Intravenous $Given 3/20/23  2107)       Drips running?  No    Home pump  No    Current LDAs: Peripheral IV: Site right A/C; Gauge 18  IV push only, no IV fluids     Results:   Labs/Imaging  Ordered and Resulted from Time of ED Arrival Up to the Time of Departure from the ED  Results for orders placed or performed during the hospital encounter of 03/20/23 (from the past 24 hour(s))   Chest XR,  PA & LAT    Narrative    EXAM: XR CHEST 2 VIEWS  LOCATION: [facility]  DATE/TIME: 3/20/2023 6:51 PM    INDICATION: hemoptysis  COMPARISON: CT chest dated 11/06/2016      Impression    IMPRESSION: Heart size is enlarged but stable. Tortuous thoracic aorta. Increasing pulmonary parenchymal opacity in the right lower lobe and in the cardiophrenic angle, atelectasis versus infiltrate. Large hiatal hernia with air-fluid level suspected on   the lateral view. Given the history of hemoptysis, consider further evaluation of the above findings with CT.   CBC with platelets differential    Narrative    The following orders were created for panel order CBC with platelets differential.  Procedure                               Abnormality         Status                     ---------                               -----------         ------                     CBC with platelets and d...[[id number]]  Abnormal            Final result                 Please view results for these tests on the individual orders.   Basic metabolic panel   Result Value Ref Range    Sodium 140 136 - 145 mmol/L    Potassium 3.6 3.4 - 5.3 mmol/L    Chloride 99 98 - 107 mmol/L    Carbon Dioxide (CO2) 34 (H) 22 - 29 mmol/L    Anion Gap 7 7 - 15 mmol/L    Urea Nitrogen 21.6 8.0 - 23.0 mg/dL    Creatinine 0.99 0.67 - 1.17 mg/dL    Calcium 9.5 8.8 - 10.2 mg/dL    Glucose 85 70 - 99 mg/dL    GFR Estimate 80 >60 mL/min/1.73m2   D dimer quantitative   Result Value Ref Range    D-Dimer Quantitative 1.71 (H) 0.00 - 0.50 ug/mL FEU    Narrative    This D-dimer assay is intended  for use in conjunction with a clinical pretest probability assessment model to exclude pulmonary embolism (PE) and deep venous thrombosis (DVT) in outpatients suspected of PE or DVT. The cut-off value is 0.50 ug/mL FEU.   [place] Draw    Narrative    The following orders were created for panel order [place] Draw.  Procedure                               Abnormality         Status                     ---------                               -----------         ------                     Extra Blue Top Tube[[id number]]                              Final result               Extra Red Top Tube[[id number]]                               Final result               Extra Green Top (Lithium...[[id number]]                                                 Extra Purple Top Tube[[id number]]                                                         Please view results for these tests on the individual orders.   CBC with platelets and differential   Result Value Ref Range    WBC Count 5.9 4.0 - 11.0 10e3/uL    RBC Count 3.89 (L) 4.40 - 5.90 10e6/uL    Hemoglobin 12.9 (L) 13.3 - 17.7 g/dL    Hematocrit 38.6 (L) 40.0 - 53.0 %    MCV 99 78 - 100 fL    MCH 33.2 (H) 26.5 - 33.0 pg    MCHC 33.4 31.5 - 36.5 g/dL    RDW 13.0 10.0 - 15.0 %    Platelet Count 202 150 - 450 10e3/uL    % Neutrophils 59 %    % Lymphocytes 26 %    % Monocytes 10 %    % Eosinophils 4 %    % Basophils 1 %    % Immature Granulocytes 0 %    NRBCs per 100 WBC 0 <1 /100    Absolute Neutrophils 3.4 1.6 - 8.3 10e3/uL    Absolute Lymphocytes 1.5 0.8 - 5.3 10e3/uL    Absolute Monocytes 0.6 0.0 - 1.3 10e3/uL    Absolute Eosinophils 0.3 0.0 - 0.7 10e3/uL    Absolute Basophils 0.1 0.0 - 0.2 10e3/uL    Absolute Immature Granulocytes 0.0 <=0.4 10e3/uL    Absolute NRBCs 0.0 10e3/uL   Extra Blue Top Tube   Result Value Ref Range    Hold Specimen JIC    Extra Red Top Tube   Result Value Ref Range    Hold Specimen JIC    CT Chest Pulmonary Embolism w Contrast    Narrative    EXAM: CT  CHEST PULMONARY EMBOLISM W CONTRAST  LOCATION: [facility]  DATE/TIME: 3/20/2023 9:25 PM    INDICATION: 4 days hemoptysis, on apixaban for Afib, elevated dimer  COMPARISON: None.  TECHNIQUE: CT chest pulmonary angiogram during arterial phase injection of IV contrast. Multiplanar reformats and MIP reconstructions were performed. Dose reduction techniques were used.   CONTRAST: 78 mL Isovue 370    FINDINGS:  ANGIOGRAM CHEST: Dilated main pulmonary artery measuring 4.3 cm in diameter. There are no left lung, right upper lobe, or right middle lobe pulmonary artery filling defects. Evaluation of the subsegmental pulmonary arteries in the right lower lobe is   limited due to suboptimal opacification due to slow flow. Fusiform enlargement of the entire thoracic aorta with mid ascending aorta measuring 4.8 cm in diameter.    LUNGS AND PLEURA: Cavitary consolidation is present in the posterior right lower lobe with air-fluid level, measuring around 5.3 x 8.9 cm (series 6, image 149). There is mixed consolidation and groundglass attenuation in the surrounding parenchyma   consistent with inflammation and/or hemorrhage. Upper lung predominant emphysema. Anatomic variant with azygos fissure. Segmental atelectasis of the lateral segment right middle lobe. No pleural effusions.    MEDIASTINUM: Massive enlargement of the left atrium which measures greater than 12 cm transverse. Moderate enlargement of the right atrium and right ventricle. No pericardial effusion. Esophagus is predominantly decompressed. Imaged thyroid gland is   normal.    CORONARY ARTERY CALCIFICATION: Moderate.    UPPER ABDOMEN: Cholelithiasis. Simple fluid attenuation cyst arising from the upper right kidney does not require specific workup or follow-up. The imaged upper abdominal aorta has patchy atheromatous calcification and measures up to 3.7 cm at the level   of the mesenteric vessel origins.    MUSCULOSKELETAL: Disc space  narrowing and osteophytes of the thoracic spine but no focal compression deformities.      Impression    IMPRESSION:    1.  The subsegmental pulmonary arteries in the right lower lobe are not well opacified. No acute pulmonary embolism in the left lung, right upper, or right middle lobes.  2.  Cavitary consolidation/abscess in the posterior right lower lobe with surrounding mixed consolidative and groundglass attenuation in either inflammation/infection and/or pulmonary hemorrhage.  3.  Severely dilated left atrium, moderately dilated pulmonary arteries and right heart chambers consistent with group 2 pulmonary hypertension.  4.  Emphysema.         For the majority of the shift this patient's behavior was Green     Cardiac Rhythm: Normal Sinus  Pt needs tele? No  Skin/wound Issues: None    Code Status: Full Code    Pain control: good    Nausea control: good    Abnormal labs/tests/findings requiring intervention: none    Patient tested for COVID 19 prior to admission: NO     OBS brochure/video discussed/provided to patient/family: No     Family present during ED course? Yes     Family Comments/Social Situation comments: none    Tasks needing completion: None    Tigre Ford RN

## 2023-03-21 NOTE — ED NOTES
Cough started on Friday no known injury no history of same/similar    Has had some positional dizziness/ vertigo in the past denies any today

## 2023-03-21 NOTE — PLAN OF CARE
Goal Outcome Evaluation:      Neuro: A&O x4  Cardiac: Apical pulse irregular     Respiratory: WNL, on RA  GI/: WNL  Diet/appetite: Regular  Activity: Ind  Pain: Denies  Skin: Bilateral scab in between 4th and 5th toe  LDA's:  IV SL between abx's     Plan: pending

## 2023-03-21 NOTE — PLAN OF CARE
"[facility] [facility] [place]MISSION NOTE    Patient admitted to room 2403 at approximately 0800 via wheel chair from emergency room. Patient was accompanied by transport tech.     Verbal SBAR report received from Michelle prior to patient arrival.     Patient ambulated to bed with stand-by assist. Patient alert and oriented X 3. The patient is not having any pain.  . Admission vital signs: Blood pressure 129/87, pulse 77, temperature 97.5  F (36.4  C), temperature source Oral, resp. rate 18, height 1.829 m (6' 0.01\"), weight 82.8 kg (182 lb 8.7 oz), SpO2 97 %. Patient was oriented to plan of care, call light, bed controls, tv, telephone, bathroom, and visiting hours.     Risk Assessment    The following safety risks were identified during admission: fall. Yellow risk band applied: YES.     Skin Initial Assessment    This writer admitted this patient and completed a full skin assessment and Catracho score in the Adult PCS flowsheet. Appropriate interventions initiated as needed.     Secondary skin check completed by Dayron.    Catracho Risk Assessment  Sensory Perception: 4-->no impairment  Moisture: 4-->rarely moist  Activity: 4-->walks frequently  Mobility: 4-->no limitation  Nutrition: 4-->excellent  Friction and Shear: 3-->no apparent problem  Catracho Score: 23  Mattress: Standard gel/foam mattress (IsoFlex, Atmos Air, etc.)  Bed Frame: Standard width and length    Education    Patient has a [place] to Observation order: No  Observation education completed and documented: N/A      Jason Montes RN      "

## 2023-03-21 NOTE — PROGRESS NOTES
Writer was second RN on patient's admission skin assessment.  Writer agrees with admitting RN's findings.    Dayron PENA [facility]

## 2023-03-21 NOTE — ED PROVIDER NOTES
Emergency Department Patient Sign-out       Brief HPI:  This is a 72 year old male signed out to me by Dr. Richardson .  See initial ED Provider note for details of the presentation.            Significant Events prior to my assuming care: none      Exam:   Patient Vitals for the past 24 hrs:   BP Temp Pulse Resp SpO2 Weight   03/21/23 0200 (!) 120/93 -- 82 -- 96 % --   03/21/23 0145 -- -- -- -- 96 % --   03/21/23 0130 -- -- -- -- 97 % --   03/21/23 0115 -- -- -- -- 97 % --   03/21/23 0100 (!) 137/98 -- 81 -- 96 % --   03/21/23 0045 -- -- -- -- 97 % --   03/21/23 0031 -- -- -- -- 97 % --   03/21/23 0030 -- -- -- -- 97 % --   03/21/23 0016 -- -- 79 -- 98 % --   03/21/23 0008 -- -- 79 -- 93 % --   03/21/23 0007 -- -- 86 -- (!) 86 % --   03/20/23 2330 (!) 129/93 -- 89 -- 96 % --   03/20/23 2315 -- -- -- -- 96 % --   03/20/23 2300 (!) 139/99 -- 76 -- 95 % --   03/20/23 2245 -- -- -- -- 95 % --   03/20/23 2241 -- -- -- -- 96 % --   03/20/23 2230 -- -- 81 -- 97 % --   03/20/23 2226 (!) 136/106 -- -- -- 97 % --   03/20/23 2215 -- -- -- -- (!) 88 % --   03/20/23 2200 (!) 134/93 -- 80 -- 90 % --   03/20/23 2145 -- -- -- -- 92 % --   03/20/23 2135 -- -- -- -- 92 % --   03/20/23 2100 (!) 123/95 -- 73 -- 94 % --   03/20/23 2034 131/89 -- -- -- 95 % --   03/20/23 2030 -- -- 76 -- 94 % --   03/20/23 2000 (!) 131/95 -- 82 18 96 % --   03/20/23 1949 (!) 133/96 -- -- -- 96 % --   03/20/23 1513 100/64 97.8  F (36.6  C) 87 18 93 % 97.1 kg (214 lb)           ED RESULTS:   Results for orders placed or performed during the hospital encounter of 03/20/23 (from the past 24 hour(s))   Chest XR,  PA & LAT     Status: None    Collection Time: 03/20/23  6:51 PM    Narrative    EXAM: XR CHEST 2 VIEWS  LOCATION: [facility]  DATE/TIME: 3/20/2023 6:51 PM    INDICATION: hemoptysis  COMPARISON: CT chest dated 11/06/2016      Impression    IMPRESSION: Heart size is enlarged but stable. Tortuous thoracic aorta.  Increasing pulmonary parenchymal opacity in the right lower lobe and in the cardiophrenic angle, atelectasis versus infiltrate. Large hiatal hernia with air-fluid level suspected on   the lateral view. Given the history of hemoptysis, consider further evaluation of the above findings with CT.   CBC with platelets differential     Status: Abnormal    Collection Time: 03/20/23  8:01 PM    Narrative    The following orders were created for panel order CBC with platelets differential.  Procedure                               Abnormality         Status                     ---------                               -----------         ------                     CBC with platelets and d...[[id number]]  Abnormal            Final result                 Please view results for these tests on the individual orders.   Basic metabolic panel     Status: Abnormal    Collection Time: 03/20/23  8:01 PM   Result Value Ref Range    Sodium 140 136 - 145 mmol/L    Potassium 3.6 3.4 - 5.3 mmol/L    Chloride 99 98 - 107 mmol/L    Carbon Dioxide (CO2) 34 (H) 22 - 29 mmol/L    Anion Gap 7 7 - 15 mmol/L    Urea Nitrogen 21.6 8.0 - 23.0 mg/dL    Creatinine 0.99 0.67 - 1.17 mg/dL    Calcium 9.5 8.8 - 10.2 mg/dL    Glucose 85 70 - 99 mg/dL    GFR Estimate 80 >60 mL/min/1.73m2   D dimer quantitative     Status: Abnormal    Collection Time: 03/20/23  8:01 PM   Result Value Ref Range    D-Dimer Quantitative 1.71 (H) 0.00 - 0.50 ug/mL FEU    Narrative    This D-dimer assay is intended for use in conjunction with a clinical pretest probability assessment model to exclude pulmonary embolism (PE) and deep venous thrombosis (DVT) in outpatients suspected of PE or DVT. The cut-off value is 0.50 ug/mL FEU.   [place] Draw     Status: None    Collection Time: 03/20/23  8:01 PM    Narrative    The following orders were created for panel order [place] Draw.  Procedure                               Abnormality         Status                     ---------                                -----------         ------                     Extra Blue Top Tube[[id number]]                              Final result               Extra Red Top Tube[[id number]]                               Final result               Extra Green Top (Lithium...[[id number]]                                                 Extra Purple Top Tube[[id number]]                                                         Please view results for these tests on the individual orders.   CBC with platelets and differential     Status: Abnormal    Collection Time: 03/20/23  8:01 PM   Result Value Ref Range    WBC Count 5.9 4.0 - 11.0 10e3/uL    RBC Count 3.89 (L) 4.40 - 5.90 10e6/uL    Hemoglobin 12.9 (L) 13.3 - 17.7 g/dL    Hematocrit 38.6 (L) 40.0 - 53.0 %    MCV 99 78 - 100 fL    MCH 33.2 (H) 26.5 - 33.0 pg    MCHC 33.4 31.5 - 36.5 g/dL    RDW 13.0 10.0 - 15.0 %    Platelet Count 202 150 - 450 10e3/uL    % Neutrophils 59 %    % Lymphocytes 26 %    % Monocytes 10 %    % Eosinophils 4 %    % Basophils 1 %    % Immature Granulocytes 0 %    NRBCs per 100 WBC 0 <1 /100    Absolute Neutrophils 3.4 1.6 - 8.3 10e3/uL    Absolute Lymphocytes 1.5 0.8 - 5.3 10e3/uL    Absolute Monocytes 0.6 0.0 - 1.3 10e3/uL    Absolute Eosinophils 0.3 0.0 - 0.7 10e3/uL    Absolute Basophils 0.1 0.0 - 0.2 10e3/uL    Absolute Immature Granulocytes 0.0 <=0.4 10e3/uL    Absolute NRBCs 0.0 10e3/uL   Extra Blue Top Tube     Status: None    Collection Time: 03/20/23  8:01 PM   Result Value Ref Range    Hold Specimen JIC    Extra Red Top Tube     Status: None    Collection Time: 03/20/23  8:01 PM   Result Value Ref Range    Hold Specimen JIC    CT Chest Pulmonary Embolism w Contrast     Status: None    Collection Time: 03/20/23  9:25 PM    Narrative    EXAM: CT CHEST PULMONARY EMBOLISM W CONTRAST  LOCATION: [facility]  DATE/TIME: 3/20/2023 9:25 PM    INDICATION: 4 days hemoptysis, on apixaban for Afib, elevated dimer  COMPARISON:  None.  TECHNIQUE: CT chest pulmonary angiogram during arterial phase injection of IV contrast. Multiplanar reformats and MIP reconstructions were performed. Dose reduction techniques were used.   CONTRAST: 78 mL Isovue 370    FINDINGS:  ANGIOGRAM CHEST: Dilated main pulmonary artery measuring 4.3 cm in diameter. There are no left lung, right upper lobe, or right middle lobe pulmonary artery filling defects. Evaluation of the subsegmental pulmonary arteries in the right lower lobe is   limited due to suboptimal opacification due to slow flow. Fusiform enlargement of the entire thoracic aorta with mid ascending aorta measuring 4.8 cm in diameter.    LUNGS AND PLEURA: Cavitary consolidation is present in the posterior right lower lobe with air-fluid level, measuring around 5.3 x 8.9 cm (series 6, image 149). There is mixed consolidation and groundglass attenuation in the surrounding parenchyma   consistent with inflammation and/or hemorrhage. Upper lung predominant emphysema. Anatomic variant with azygos fissure. Segmental atelectasis of the lateral segment right middle lobe. No pleural effusions.    MEDIASTINUM: Massive enlargement of the left atrium which measures greater than 12 cm transverse. Moderate enlargement of the right atrium and right ventricle. No pericardial effusion. Esophagus is predominantly decompressed. Imaged thyroid gland is   normal.    CORONARY ARTERY CALCIFICATION: Moderate.    UPPER ABDOMEN: Cholelithiasis. Simple fluid attenuation cyst arising from the upper right kidney does not require specific workup or follow-up. The imaged upper abdominal aorta has patchy atheromatous calcification and measures up to 3.7 cm at the level   of the mesenteric vessel origins.    MUSCULOSKELETAL: Disc space narrowing and osteophytes of the thoracic spine but no focal compression deformities.      Impression    IMPRESSION:    1.  The subsegmental pulmonary arteries in the right lower lobe are not well  opacified. No acute pulmonary embolism in the left lung, right upper, or right middle lobes.  2.  Cavitary consolidation/abscess in the posterior right lower lobe with surrounding mixed consolidative and groundglass attenuation in either inflammation/infection and/or pulmonary hemorrhage.  3.  Severely dilated left atrium, moderately dilated pulmonary arteries and right heart chambers consistent with group 2 pulmonary hypertension.  4.  Emphysema.         ED MEDICATIONS:   Medications   piperacillin-tazobactam (ZOSYN) infusion 3.375 g (0 g Intravenous Stopped 3/21/23 0037)   amLODIPine (NORVASC) tablet 5 mg (has no administration in time range)   atenolol-chlorthalidone (TENORETIC) 50-25 mg combo dose (has no administration in time range)   losartan (COZAAR) tablet 100 mg (has no administration in time range)   tamsulosin (FLOMAX) capsule 0.4 mg (0.4 mg Oral $Given 3/21/23 0122)   iopamidol (ISOVUE-370) solution 78 mL (78 mLs Intravenous $Given 3/20/23 2106)   sodium chloride 0.9 % bag 500mL for CT scan flush use (100 mLs Intravenous $Given 3/20/23 2107)         Impression:    ICD-10-CM    1. Abscess of lower lobe of right lung without pneumonia (H)  J85.2       2. Atrial fibrillation, unspecified type (H)  I48.91       3. Essential hypertension  I10       4. Chronic anticoagulation  Z79.01           Plan:    Pending studies include none. Admit here pending - no bed currently. Holding anticoagulation and given abx for pulmonary abscess. Had transient hypoxia - on O2. Accepted by [facility] however bed was given away to another patient from another hospital.       4:39 AM: Desaturated to 85% when ambulating without O2. Recovered in about 1-2 min. Remaining on 2L at rest with sats in the low to mid 90s after recovering.    Bed became available and patient was admitted to the hospital.       MD Hemanth Rae, Kota Byrd MD  03/27/23 1399

## 2023-03-21 NOTE — PHARMACY-VANCOMYCIN DOSING SERVICE
"Pharmacy Vancomycin Initial Note  Date of Service 2023  Patient's  1950  72 year old, male    Indication: Abscess    Current estimated CrCl = Estimated Creatinine Clearance: 79.1 mL/min (based on SCr of 0.99 mg/dL).    Creatinine for last 3 days  3/20/2023:  8:01 PM Creatinine 0.99 mg/dL    Recent Vancomycin Level(s) for last 3 days  No results found for requested labs within last 72 hours.      Vancomycin IV Administrations (past 72 hours)      No vancomycin orders with administrations in past 72 hours.                Nephrotoxins and other renal medications (From now, onward)    Start     Dose/Rate Route Frequency Ordered Stop    23 0800  atenolol-chlorthalidone (TENORETIC) 50-25 mg combo dose          Oral DAILY 23 0101      23 0800  vancomycin (VANCOCIN) 1000 mg in dextrose 5% 200 mL PREMIX         1,000 mg  200 mL/hr over 1 Hours Intravenous EVERY 12 HOURS 23 0756      23 2345  piperacillin-tazobactam (ZOSYN) infusion 3.375 g        Note to Pharmacy: For [facility], [facility] and [facility]: For Zosyn-naive patients, use the \"Zosyn initial dose + extended infusion\" order panel.    3.375 g  100 mL/hr over 30 Minutes Intravenous EVERY 6 HOURS 23 2341            Contrast Orders - past 72 hours (72h ago, onward)    Start     Dose/Rate Route Frequency Stop    23 204  iopamidol (ISOVUE-370) solution 78 mL         78 mL Intravenous ONCE 23          [employer] Prediction of Planned Initial Vancomycin Regimen  Loading dose: N/A  Regimen: 1000 mg IV every 12 hours.  Start time: 07:55 on 2023  Exposure target: AUC24 (range)400-600 mg/L.hr   AUC24,ss: 528 mg/L.hr  Probability of AUC24 > 400: 80 %  Ctrough,ss: 17.7 mg/L  Probability of Ctrough,ss > 20: 37 %  Probability of nephrotoxicity (Lodise LILA ): 14 %          Plan:  1. Start vancomycin  1000 mg IV q12h.   2. Vancomycin monitoring method: AUC  3. Vancomycin therapeutic monitoring goal: 400-600 " mg*h/L  4. Pharmacy will check vancomycin levels as appropriate in 1-3 Days.    5. Serum creatinine levels will be ordered daily for the first week of therapy and at least twice weekly for subsequent weeks.      Peri Dobson RPH

## 2023-03-21 NOTE — ED PROVIDER NOTES
History     Chief Complaint   Patient presents with     Hemoptysis     HPI  Jeff Vanegas is a 72 year old male with history will for A-fib on apixaban, hypertension, hyperlipidemia, with neoplasm of urinary bladder status post TURBT in 2016, with four days of hemoptysis.  Has associated nasal congestion and rhinorrhea.  No sore throat.  No fevers.  No chills.  No shortness of breath or chest pain.  No abdominal pain.  No nausea, vomiting or diarrhea.  Was previously on warfarin but was switched to apixaban as this is much easier for him.  Reports taking his apixaban twice daily as directed and no recent missed doses.     The patient's PMHx, Surgical Hx, Allergies, and Medications were all reviewed with the patient.    Allergies:  Allergies   Allergen Reactions     Diatrizoate Hives     IV dye USED DURING KIDNEY SCAN         Problem List:    Patient Active Problem List    Diagnosis Date Noted     Hypokalemia 03/07/2017     Priority: Medium     Nephrolithiasis 03/07/2017     Priority: Medium     Several stones, one of which required lithotripsy at [facility].  Latest stone at [facility] - 9mm right ureteropelvic stone 03/2017       Malignant neoplasm of urinary bladder (H) 01/13/2017     Priority: Medium     Overview:   TCC s/p TURBT 3/15/16.       ACP (advance care planning) 01/02/2017     Priority: Medium     Advance Care Planning 1/2/2017: Receipt of ACP document:  Received: Health Care Directive which was witnessed or notarized on 3/15/16.  Document previously scanned on 11/8/16.  Validation form completed and sent to be scanned.  Code Status reflects choices in most recent ACP document.  Confirmed/documented designated decision maker(s).  Added by Silva Blakely RN, Advance Care Planning Liaison.         Mitral regurgitation 11/07/2016     Priority: Medium     Echo 11/7/2016- Left atrium is massively enlarged.There is eccentric MR jet, estimated 2-3+ but may be underestimated due eccentricity.  Consider KEESHA for further assessment if clinically appropriate. Left ventricular systolic function is normal. The visual ejection fraction is estimated at 60-65%. The right ventricle is mildly dilated. The right ventricular systolic function is normal. There is mild to moderate (1-2+) aortic regurgitation. There is mild to moderate (1-2+) tricuspid regurgitation. Right ventricular systolic pressure could be underestimated due to incomplete tricuspid regurgitation velocity envelope. No previous study is available for comparison.       Depression      Priority: Medium     HLD (hyperlipidemia)      Priority: Medium     Acute systolic heart failure (H) 11/06/2016     Priority: Medium     Echo 11/2016:  Left atrium is massively enlarged.  There is eccentric MR jet, estimated 2-3+ but may be underestimated due  eccentricity. Consider KEESHA for further assessment if clinically appropriate.  Left ventricular systolic function is normal.  The visual ejection fraction is estimated at 60-65%.  The right ventricle is mildly dilated.  The right ventricular systolic function is normal.  There is mild to moderate (1-2+) aortic regurgitation.  There is mild to moderate (1-2+) tricuspid regurgitation.  Right ventricular systolic pressure could be underestimated due to incomplete  tricuspid regurgitation velocity envelope.  No previous study is available for comparison.       Atrial fibrillation (H) 12/30/2011     Priority: Medium     New onset 1/2012. CHADS2= 1 so anticoagulated with  mg daily  Re-evaluation 11/7/2016 [id number]= 3       Elevated prostate specific antigen (PSA) 01/27/2009     Priority: Medium     Impotence of organic origin 01/27/2009     Priority: Medium     Essential hypertension 12/12/2006     Priority: Medium     Migraine without status migrainosus, not intractable 12/12/2006     Priority: Medium     Tobacco use 12/12/2006     Priority: Medium        Past Medical History:    Past Medical History:   Diagnosis  Date     Calculus of kidney 12/12/2006       Past Surgical History:    Past Surgical History:   Procedure Laterality Date     ------------OTHER-------------  2016    transurethral bladder tumor with stenting     COLONOSCOPY N/A 4/9/2015    Procedure: COLONOSCOPY;  Surgeon: Thompson Bernardo MD;  Location: [facility]     CYSTOSCOPY, RETROGRADES, INSERT STENT URETER(S), COMBINED Right 3/7/2017    Procedure: COMBINED CYSTOSCOPY, RETROGRADES, INSERT STENT URETER(S);  Surgeon: DUY Acosta MD;  Location: [facility]     LITHOTRIPSY  1992     Tibialis Posterior Tendon Repair Right 11/2016       Family History:    Family History   Problem Relation Age of Onset     Colon Cancer Father      Diabetes Type 2  Mother        Social History:  Marital Status:   [2]  Social History     Tobacco Use     Smoking status: Some Days     Types: Cigars     Tobacco comments:     couple times a week   Substance Use Topics     Alcohol use: Yes     Alcohol/week: 0.0 standard drinks     Comment: occasional        Medications:    amLODIPine (NORVASC) 5 MG tablet  apixaban ANTICOAGULANT (ELIQUIS) 2.5 MG tablet  atenolol-chlorthalidone (TENORETIC) 50-25 MG per tablet  losartan (COZAAR) 100 MG tablet  potassium citrate (UROCIT-K) 10 MEQ (1080 MG) CR tablet  Probiotic Product (PROBIOTIC DAILY PO)  tamsulosin (FLOMAX) 0.4 MG capsule  aspirin (ASA) 81 MG EC tablet  HYDROcodone-acetaminophen (NORCO) 5-325 MG per tablet  LOSARTAN POTASSIUM PO  Multiple Vitamins-Minerals (MULTIVITAMIN ADULT) TABS  VITAMIN D, CHOLECALCIFEROL, PO          Review of Systems  Pertinent positives and negatives mentioned in HPI    Physical Exam   BP: 100/64  Pulse: 87  Temp: 97.8  F (36.6  C)  Resp: 18  Weight: 97.1 kg (214 lb)  SpO2: 93 %    Physical Exam  GEN: Awake, alert, and cooperative. No acute distress.  Resting comfortably in semireclined position on cart  HENT: MMM. External ears and nose normal bilaterally.  EYES: EOM intact. Conjunctiva clear. No discharge.   NECK:  Symmetric, freely mobile.  No JVD.  CV : Regular rate and rhythm.  Subtle systolic murmur heard across precordium  PULM: Normal effort. No wheezes, rales, or rhonchi bilaterally.  ABD: Soft, non-tender, non-distended. No rebound or guarding.   NEURO: Normal speech. Following commands. CN II-XII grossly intact. Answering questions and interacting appropriately.   EXT: No gross deformity. Warm and well perfused.  No lower extremity edema, erythema or tenderness.  INT: Warm. No diaphoresis. Normal color.        ED Course        Procedures         EKG: Interpreted by myself. No clear p waves and irregular rhythm consistent with Afib. Significant artifact limits interpretation.     Critical Care time:  none               Results for orders placed or performed during the hospital encounter of 03/20/23 (from the past 24 hour(s))   Chest XR,  PA & LAT    Narrative    EXAM: XR CHEST 2 VIEWS  LOCATION: [facility]  DATE/TIME: 3/20/2023 6:51 PM    INDICATION: hemoptysis  COMPARISON: CT chest dated 11/06/2016      Impression    IMPRESSION: Heart size is enlarged but stable. Tortuous thoracic aorta. Increasing pulmonary parenchymal opacity in the right lower lobe and in the cardiophrenic angle, atelectasis versus infiltrate. Large hiatal hernia with air-fluid level suspected on   the lateral view. Given the history of hemoptysis, consider further evaluation of the above findings with CT.   CBC with platelets differential    Narrative    The following orders were created for panel order CBC with platelets differential.  Procedure                               Abnormality         Status                     ---------                               -----------         ------                     CBC with platelets and d...[[id number]]  Abnormal            Final result                 Please view results for these tests on the individual orders.   Basic metabolic panel   Result Value Ref Range    Sodium 140  136 - 145 mmol/L    Potassium 3.6 3.4 - 5.3 mmol/L    Chloride 99 98 - 107 mmol/L    Carbon Dioxide (CO2) 34 (H) 22 - 29 mmol/L    Anion Gap 7 7 - 15 mmol/L    Urea Nitrogen 21.6 8.0 - 23.0 mg/dL    Creatinine 0.99 0.67 - 1.17 mg/dL    Calcium 9.5 8.8 - 10.2 mg/dL    Glucose 85 70 - 99 mg/dL    GFR Estimate 80 >60 mL/min/1.73m2   D dimer quantitative   Result Value Ref Range    D-Dimer Quantitative 1.71 (H) 0.00 - 0.50 ug/mL FEU    Narrative    This D-dimer assay is intended for use in conjunction with a clinical pretest probability assessment model to exclude pulmonary embolism (PE) and deep venous thrombosis (DVT) in outpatients suspected of PE or DVT. The cut-off value is 0.50 ug/mL FEU.   [facility] Draw    Narrative    The following orders were created for panel order [facility] Draw.  Procedure                               Abnormality         Status                     ---------                               -----------         ------                     Extra Blue Top Tube[[id number]]                              Final result               Extra Red Top Tube[[id number]]                               Final result               Extra Green Top (Lithium...[[id number]]                                                 Extra Purple Top Tube[[id number]]                                                         Please view results for these tests on the individual orders.   CBC with platelets and differential   Result Value Ref Range    WBC Count 5.9 4.0 - 11.0 10e3/uL    RBC Count 3.89 (L) 4.40 - 5.90 10e6/uL    Hemoglobin 12.9 (L) 13.3 - 17.7 g/dL    Hematocrit 38.6 (L) 40.0 - 53.0 %    MCV 99 78 - 100 fL    MCH 33.2 (H) 26.5 - 33.0 pg    MCHC 33.4 31.5 - 36.5 g/dL    RDW 13.0 10.0 - 15.0 %    Platelet Count 202 150 - 450 10e3/uL    % Neutrophils 59 %    % Lymphocytes 26 %    % Monocytes 10 %    % Eosinophils 4 %    % Basophils 1 %    % Immature Granulocytes 0 %    NRBCs per 100 WBC 0 <1 /100    Absolute Neutrophils 3.4 1.6 -  8.3 10e3/uL    Absolute Lymphocytes 1.5 0.8 - 5.3 10e3/uL    Absolute Monocytes 0.6 0.0 - 1.3 10e3/uL    Absolute Eosinophils 0.3 0.0 - 0.7 10e3/uL    Absolute Basophils 0.1 0.0 - 0.2 10e3/uL    Absolute Immature Granulocytes 0.0 <=0.4 10e3/uL    Absolute NRBCs 0.0 10e3/uL   Extra Blue Top Tube   Result Value Ref Range    Hold Specimen JIC    Extra Red Top Tube   Result Value Ref Range    Hold Specimen JIC    CT Chest Pulmonary Embolism w Contrast    Narrative    EXAM: CT CHEST PULMONARY EMBOLISM W CONTRAST  LOCATION: [facility]  DATE/TIME: 3/20/2023 9:25 PM    INDICATION: 4 days hemoptysis, on apixaban for Afib, elevated dimer  COMPARISON: None.  TECHNIQUE: CT chest pulmonary angiogram during arterial phase injection of IV contrast. Multiplanar reformats and MIP reconstructions were performed. Dose reduction techniques were used.   CONTRAST: 78 mL Isovue 370    FINDINGS:  ANGIOGRAM CHEST: Dilated main pulmonary artery measuring 4.3 cm in diameter. There are no left lung, right upper lobe, or right middle lobe pulmonary artery filling defects. Evaluation of the subsegmental pulmonary arteries in the right lower lobe is   limited due to suboptimal opacification due to slow flow. Fusiform enlargement of the entire thoracic aorta with mid ascending aorta measuring 4.8 cm in diameter.    LUNGS AND PLEURA: Cavitary consolidation is present in the posterior right lower lobe with air-fluid level, measuring around 5.3 x 8.9 cm (series 6, image 149). There is mixed consolidation and groundglass attenuation in the surrounding parenchyma   consistent with inflammation and/or hemorrhage. Upper lung predominant emphysema. Anatomic variant with azygos fissure. Segmental atelectasis of the lateral segment right middle lobe. No pleural effusions.    MEDIASTINUM: Massive enlargement of the left atrium which measures greater than 12 cm transverse. Moderate enlargement of the right atrium and right  ventricle. No pericardial effusion. Esophagus is predominantly decompressed. Imaged thyroid gland is   normal.    CORONARY ARTERY CALCIFICATION: Moderate.    UPPER ABDOMEN: Cholelithiasis. Simple fluid attenuation cyst arising from the upper right kidney does not require specific workup or follow-up. The imaged upper abdominal aorta has patchy atheromatous calcification and measures up to 3.7 cm at the level   of the mesenteric vessel origins.    MUSCULOSKELETAL: Disc space narrowing and osteophytes of the thoracic spine but no focal compression deformities.      Impression    IMPRESSION:    1.  The subsegmental pulmonary arteries in the right lower lobe are not well opacified. No acute pulmonary embolism in the left lung, right upper, or right middle lobes.  2.  Cavitary consolidation/abscess in the posterior right lower lobe with surrounding mixed consolidative and groundglass attenuation in either inflammation/infection and/or pulmonary hemorrhage.  3.  Severely dilated left atrium, moderately dilated pulmonary arteries and right heart chambers consistent with group 2 pulmonary hypertension.  4.  Emphysema.         Medications   piperacillin-tazobactam (ZOSYN) infusion 3.375 g (0 g Intravenous Stopped 3/21/23 0037)   amLODIPine (NORVASC) tablet 5 mg (has no administration in time range)   atenolol-chlorthalidone (TENORETIC) 50-25 mg combo dose (has no administration in time range)   tamsulosin (FLOMAX) capsule 0.4 mg (has no administration in time range)   losartan (COZAAR) tablet 100 mg (has no administration in time range)   iopamidol (ISOVUE-370) solution 78 mL (78 mLs Intravenous $Given 3/20/23 2106)   sodium chloride 0.9 % bag 500mL for CT scan flush use (100 mLs Intravenous $Given 3/20/23 2107)       Assessments & Plan (with Medical Decision Making)   72 year old male with past medical history notable for atrial fibrillation on apixaban with 4 days of hemoptysis.  Denies chest pain or shortness of breath.   No fevers or chills.  Has been taking apixaban regularly and no history of VTE.  EKG obtained but difficult to interpret due to significant artifact limiting its interpretation.  Does appear to be atrial fibrillation.  CBC without leukocytosis.  D-dimer elevated to 1.71 and BMP grossly normal, bicarb 34.  Hemoglobin 12.9 however most recent values for comparison in our system was 6 years ago at which time it was 14.1.    CT scan of chest with IV contrast concerning for cavitary consolidation/abscess in the posterior right lower lobe with some rounding mixed consolidative and groundglass attenuation which could be infectious, inflammatory and/or pulmonary hemorrhage.  No signs of pulmonary embolism.  I reviewed the findings with Dr. Larry lo/ pulmonology who recommended treating for abscess with parental Zosyn or Unasyn, obtaining sputum culture and likely prolonged course of oral antibiotics (Augmentin) and repeat CT scan.  Did not feel that patient required transfer for emergent bronchoscopy and did not think that IR procedure would be necessary at this time.  Thought patient would be appropriate for admit at [facility] and can tailor antimicrobial therapy based on sputum culture results.  Plan to hold apixaban.  Pulmonology is happy to consult as needed.  Would need a pulmonology referral for follow-up.    I discussed findings with patient and he is agreeable to staying.  He was given IV piperacillin tazobactam as well as his home meds with exception of apixaban. When I was in room, patient was noted to be on supplemental oxygen at 2L by nasal canula. I was not informed that he needed supplemental oxygen. He was breathing comfortably in full sentences at SpO2 of 98%.     Case then discussed with Dr. Dickey With hospitalist service who excepted admission.  Transition orders placed.      I have reviewed the nursing notes.         New Prescriptions    No medications on file       Final diagnoses:   Abscess of lower lobe  of right lung without pneumonia (H)   Atrial fibrillation, unspecified type (H)   Essential hypertension   Chronic anticoagulation     Kirk Richardson MD    3/20/2023   [facility] EMERGENCY DEPT    Disclaimer: This note consists of words and symbols derived from keyboarding and dictation using voice recognition software.  As a result, there may be errors that have gone undetected.  Please consider this when interpreting information found in this note.             Kirk Richardson MD  03/21/23 0127

## 2023-03-21 NOTE — PROGRESS NOTES
"[facility] Medicine Progress Note  Date of Service (when I saw the patient): 03/21/2023    REASON FOR ADMISSION / INTERVAL HISTORY:  Presented to ED 3/20 with hemoptysis/ SOB  x 4 days. No other sx.   See details below       Assessment & Plan      Hemoptysis  CT chest showed RLL cavitary consolidation/pulmonary abscess. Has nl WBC/ afebrile but SOB. 1ppd smoker until 25 yrs ago. No dx COPD. Case reviewed by Pulm-ICU provider remotely. Recommended rx with antbx and follow. Pt started on zosyn/ vanco. Sputum cx sent . Strep pneumo antgn ur-p.  Will continue antbx/ follow sputum cx    Acute hypoxic resp failure  Needing 2l o2 NC to keep sats >90%. Likely due to above.     HTN  Stable.  -cont atenolol, chlorthalidone, losartan     BPH  -cont tamsulosin     afib  stable  -hold Eliquis  -cont atenolol    Diet: Regular Diet Adult    DVT Prophylaxis: Low Risk/Ambulatory with no VTE prophylaxis indicated  Silva Catheter: Not present  Code Status: Full Code    Lines: GURINDER WAHL MD   Pg [phone]        ROS:  As described in A/P and Exam.  Otherwise ALL are  negative.    PHYSICAL EXAM:  All vitals have been reviewed    Blood pressure 121/84, pulse 73, temperature 97.6  F (36.4  C), temperature source Oral, resp. rate 18, height 1.829 m (6' 0.01\"), weight 82.8 kg (182 lb 8.7 oz), SpO2 93 %.    No intake/output data recorded.    GENERAL APPEARANCE: healthy, alert and no distress  EYES: conjunctiva clear, eyes grossly normal  HENT: external ears and nose normal   RESP: lungs -decreased BS  R base-crackles R base- no rales, rhonchi or wheezes  CV: regular rate and rhythm, normal S1 S2, no S3 or S4 and no murmur, click or rub   ABDOMEN: soft, nontender, no HSM or masses and bowel sounds normal  MS: no clubbing, cyanosis; no edema  SKIN: clear without significant rashes or lesions  NEURO: -non-focal moves all 4 extr    ROUTINE  LABS (Last four results)  CMP  Recent Labs   Lab " 03/20/23 2001      POTASSIUM 3.6   CHLORIDE 99   CO2 34*   ANIONGAP 7   GLC 85   BUN 21.6   CR 0.99   GFRESTIMATED 80   HARJINDER 9.5     CBC  Recent Labs   Lab 03/20/23 2001   WBC 5.9   RBC 3.89*   HGB 12.9*   HCT 38.6*   MCV 99   MCH 33.2*   MCHC 33.4   RDW 13.0        INRNo lab results found in last 7 days.  Arterial Blood GasNo lab results found in last 7 days.    Recent Results (from the past 24 hour(s))   Chest XR,  PA & LAT    Narrative    EXAM: XR CHEST 2 VIEWS  LOCATION: [facility]  DATE/TIME: 3/20/2023 6:51 PM    INDICATION: hemoptysis  COMPARISON: CT chest dated 11/06/2016      Impression    IMPRESSION: Heart size is enlarged but stable. Tortuous thoracic aorta. Increasing pulmonary parenchymal opacity in the right lower lobe and in the cardiophrenic angle, atelectasis versus infiltrate. Large hiatal hernia with air-fluid level suspected on   the lateral view. Given the history of hemoptysis, consider further evaluation of the above findings with CT.   CT Chest Pulmonary Embolism w Contrast    Narrative    EXAM: CT CHEST PULMONARY EMBOLISM W CONTRAST  LOCATION: [facility]  DATE/TIME: 3/20/2023 9:25 PM    INDICATION: 4 days hemoptysis, on apixaban for Afib, elevated dimer  COMPARISON: None.  TECHNIQUE: CT chest pulmonary angiogram during arterial phase injection of IV contrast. Multiplanar reformats and MIP reconstructions were performed. Dose reduction techniques were used.   CONTRAST: 78 mL Isovue 370    FINDINGS:  ANGIOGRAM CHEST: Dilated main pulmonary artery measuring 4.3 cm in diameter. There are no left lung, right upper lobe, or right middle lobe pulmonary artery filling defects. Evaluation of the subsegmental pulmonary arteries in the right lower lobe is   limited due to suboptimal opacification due to slow flow. Fusiform enlargement of the entire thoracic aorta with mid ascending aorta measuring 4.8 cm in diameter.    LUNGS AND  PLEURA: Cavitary consolidation is present in the posterior right lower lobe with air-fluid level, measuring around 5.3 x 8.9 cm (series 6, image 149). There is mixed consolidation and groundglass attenuation in the surrounding parenchyma   consistent with inflammation and/or hemorrhage. Upper lung predominant emphysema. Anatomic variant with azygos fissure. Segmental atelectasis of the lateral segment right middle lobe. No pleural effusions.    MEDIASTINUM: Massive enlargement of the left atrium which measures greater than 12 cm transverse. Moderate enlargement of the right atrium and right ventricle. No pericardial effusion. Esophagus is predominantly decompressed. Imaged thyroid gland is   normal.    CORONARY ARTERY CALCIFICATION: Moderate.    UPPER ABDOMEN: Cholelithiasis. Simple fluid attenuation cyst arising from the upper right kidney does not require specific workup or follow-up. The imaged upper abdominal aorta has patchy atheromatous calcification and measures up to 3.7 cm at the level   of the mesenteric vessel origins.    MUSCULOSKELETAL: Disc space narrowing and osteophytes of the thoracic spine but no focal compression deformities.      Impression    IMPRESSION:    1.  The subsegmental pulmonary arteries in the right lower lobe are not well opacified. No acute pulmonary embolism in the left lung, right upper, or right middle lobes.  2.  Cavitary consolidation/abscess in the posterior right lower lobe with surrounding mixed consolidative and groundglass attenuation in either inflammation/infection and/or pulmonary hemorrhage.  3.  Severely dilated left atrium, moderately dilated pulmonary arteries and right heart chambers consistent with group 2 pulmonary hypertension.  4.  Emphysema.

## 2023-03-22 VITALS
RESPIRATION RATE: 18 BRPM | WEIGHT: 183.2 LBS | DIASTOLIC BLOOD PRESSURE: 74 MMHG | HEART RATE: 59 BPM | SYSTOLIC BLOOD PRESSURE: 101 MMHG | OXYGEN SATURATION: 92 % | TEMPERATURE: 97.8 F | BODY MASS INDEX: 24.81 KG/M2 | HEIGHT: 72 IN

## 2023-03-22 LAB
CREAT SERPL-MCNC: 1.02 MG/DL (ref 0.67–1.17)
GFR SERPL CREATININE-BSD FRML MDRD: 77 ML/MIN/1.73M2
HOLD SPECIMEN: NORMAL

## 2023-03-22 PROCEDURE — 250N000011 HC RX IP 250 OP 636: Performed by: INTERNAL MEDICINE

## 2023-03-22 PROCEDURE — 250N000013 HC RX MED GY IP 250 OP 250 PS 637: Performed by: EMERGENCY MEDICINE

## 2023-03-22 PROCEDURE — 99239 HOSP IP/OBS DSCHRG MGMT >30: CPT | Performed by: INTERNAL MEDICINE

## 2023-03-22 PROCEDURE — 82565 ASSAY OF CREATININE: CPT | Performed by: INTERNAL MEDICINE

## 2023-03-22 PROCEDURE — 250N000011 HC RX IP 250 OP 636: Performed by: EMERGENCY MEDICINE

## 2023-03-22 PROCEDURE — 36415 COLL VENOUS BLD VENIPUNCTURE: CPT | Performed by: INTERNAL MEDICINE

## 2023-03-22 RX ORDER — ALBUTEROL SULFATE 90 UG/1
2 AEROSOL, METERED RESPIRATORY (INHALATION) EVERY 6 HOURS PRN
Qty: 18 G | Refills: 3 | Status: SHIPPED | OUTPATIENT
Start: 2023-03-22

## 2023-03-22 RX ORDER — ATENOLOL 50 MG/1
50 TABLET ORAL DAILY
Qty: 30 TABLET | Refills: 1 | Status: SHIPPED | OUTPATIENT
Start: 2023-03-22

## 2023-03-22 RX ORDER — DOXYCYCLINE 100 MG/1
100 CAPSULE ORAL 2 TIMES DAILY
Qty: 20 CAPSULE | Refills: 0 | Status: SHIPPED | OUTPATIENT
Start: 2023-03-22 | End: 2023-04-01

## 2023-03-22 RX ADMIN — AMLODIPINE BESYLATE 5 MG: 5 TABLET ORAL at 09:35

## 2023-03-22 RX ADMIN — LOSARTAN POTASSIUM 100 MG: 50 TABLET, FILM COATED ORAL at 09:35

## 2023-03-22 RX ADMIN — TAZOBACTAM SODIUM AND PIPERACILLIN SODIUM 3.38 G: 375; 3 INJECTION, SOLUTION INTRAVENOUS at 11:27

## 2023-03-22 RX ADMIN — CHLORTHALIDONE: 25 TABLET ORAL at 09:35

## 2023-03-22 RX ADMIN — VANCOMYCIN HYDROCHLORIDE 1000 MG: 1 INJECTION, SOLUTION INTRAVENOUS at 09:38

## 2023-03-22 RX ADMIN — TAZOBACTAM SODIUM AND PIPERACILLIN SODIUM 3.38 G: 375; 3 INJECTION, SOLUTION INTRAVENOUS at 05:32

## 2023-03-22 ASSESSMENT — ACTIVITIES OF DAILY LIVING (ADL)
ADLS_ACUITY_SCORE: 21
ADLS_ACUITY_SCORE: 21
ADLS_ACUITY_SCORE: 20
ADLS_ACUITY_SCORE: 20
ADLS_ACUITY_SCORE: 21
ADLS_ACUITY_SCORE: 20
ADLS_ACUITY_SCORE: 20

## 2023-03-22 NOTE — PROGRESS NOTES
Antimicrobial Stewardship Team Note    Antimicrobial Stewardship Program - A joint venture between [facility] and  Physicians to optimize antibiotic management.  NOT a formal consult - Restricted Antimicrobial Review     Patient: Jeff Vanegas  MRN: [id number]  Allergies: Diatrizoate    Brief Summary:   Jeff Vanegas is a 71 y/o male admitted for work up of a cavitary consolidation of the RLL in the setting of hemoptysis currently on day 2 of broad spectrum antibiotics.    HPI: Mr. Vanegas has a PMH significant for Afib on apixaban and 10+ year smoking hx. He presented to the ED after experiencing hemoptysis for four days. In the ED, he did not report shortness of breath, fevers, chills or chest pain. In the H&P's physical exam, it was noted the that there was no accessory muscle use, breath sounds were clear with no wheezes, rales, or rhonchi. Broad spectrum antibiotics, piperacillin/tazobactam and vancomycin were started in the ED. He endorsed good adherence to his apixaban pharmacotherapy. He has required supplemental oxygen intermittent supplemental O2, and has remained afebrile and normal infection/inflammatory markers throughout the admission. CT imaging of the chest/abdomen revealed a cavitary consolidation/abscess in the posterior right lower lobe with surrounding mixed consolidative and groundglass attenuation. Additionally, the CT impression noted emphysema is present. The physical exam on the following day noted decreased breath sounds with right base crackles but no rales, rhonchi or wheezes. Suggesting either either inflammation/infection and/or a pulmonary hemorrhage. Infectious work up includes a sputum culture with no organism growth on the Gram stain or in the culture to date. Additionally, both MRSA nares and urinary Streptococcus pneumonia antigen were negative.       Active Anti-infective Medications   (From admission, onward)                 Start     Stop    03/21/23  0800  vancomycin in dextrose  1,000 mg,   Intravenous,   200 mL/hr,   EVERY 12 HOURS        Abscess        --    03/20/23 2345  piperacillin-tazobactam  3.375 g,   Intravenous,   100 mL/hr,   EVERY 6 HOURS        pulmonary abscess        --                  Assessment: Pulmonary Hemorrhage vs. Bacterial Pneumonia    While the consolidation on the CT are consistent with a bacterial pneumonia, the lack of any elevated inflammatory markers, fever, suggesting an alternative explanation for consolidation. We agree a bronchoscopy is not indicated, especially for bacterial pneumonia workup as sputum culture correlate well with BAL cultures. Reasonable to treat for CAP-associated organisms as a bacterial abscess could have incited the hemorraghic process. We feel two weeks is too short to assess antibiotic response and suggest more time to assess response.    Recommendations:   Discontinue vancomycin and piperacillin/tazobactam  Agree with Augmentin 875 mg BID, but recommend four weeks of treatment.  Reasonable to continue doxycyline although unlikely for an atypical organism to form an abscess.  Obtain repeat CT in four weeks    Pharmacy took the following actions: Called/paged provider, Sticky note reminder created.    Discussed with ID Staff Costa Rivera MD and Carole Dobbins PharmD, BCIDP   PAULETTE ADORNOD  [phone]    Vital Signs/Clinical Features:  Vitals         03/20 0700  03/21 0659 03/21 0700  03/22 0659 03/22 0700  03/22 1301   Most Recent      Temp ( F)   97.8    97.5 -  98.2       98.2 (36.8) 03/22 0448    Pulse 73 -  89    73 -  86    63 -  84     63 03/22 1100    Resp   18    16 -  18      18     18 03/22 1100    /64 -  139/99    112/61 -  129/87    108/75 -  111/85     108/76 03/22 1100    SpO2 (%) 86 -  98    90 -  97    92 -  95     95 03/22 1000            Labs  Estimated Creatinine Clearance: 74.7 mL/min (based on SCr of 1.02 mg/dL).  Recent Labs   Lab Test 11/06/16  1000 11/07/16  0605  03/07/17  0108 03/20/23 2001 03/22/23  0608   CR 0.93 1.04 1.22 0.99 1.02       Recent Labs   Lab Test 11/06/16  1000 11/07/16  0605 03/07/17  0108 03/20/23 2001 03/21/23  2242   WBC 5.8 7.0 7.4 5.9 6.0   ANEU 4.2  --  5.8  --   --    ALYM 0.9  --  0.8  --   --    GIGI 0.6  --  0.6  --   --    AEOS 0.2  --  0.1  --   --    HGB 13.0* 14.0 14.1 12.9* 12.4*   HCT 39.6* 41.4 41.7 38.6* 36.8*   MCV 97 94 94 99 98    229 210 202 188       Recent Labs   Lab Test 11/06/16  1000 03/07/17  0108   BILITOTAL 1.0 0.8   ALKPHOS 75 102   ALBUMIN 3.3* 3.5   AST 27 25   ALT 23 21       Recent Labs   Lab Test 11/06/16  1000 11/06/16  1635   PCAL  --  <0.05  <0.05 ng/ml  Normal  Recommendation: Very low risk of bacterial infection.   Discourage antibiotics unless strong clinical suspicion for serious infection.     CRP 52.9*  --              Culture Results:  7-Day Micro Results       Procedure Component Value Units Date/Time    Strep pneumo Agn Ur greater or equal to 13yrs or CSF any age [[id number]]  (Normal) Collected: 03/21/23 1153    Order Status: Completed Lab Status: Final result Updated: 03/21/23 2017    Specimen: Urine, Clean Catch      Streptococcus pneumoniae antigen Negative     Comment: A negative Streptococcus pneumoniae antigen result does not rule out infection with Streptococcus pneumoniae.       MRSA MSSA PCR, Nasal Swab [[id number]] Collected: 03/21/23 0808    Order Status: Completed Lab Status: Final result Updated: 03/21/23 1123    Specimen: Swab from Nares, Bilateral      MRSA Target DNA Negative     SA Target DNA Positive    Narrative:      The CepSproxilid  Xpert SA Nasal Complete assay performed in the GeneXpert  Dx System is a qualitative in vitro diagnostic test designed for rapid detection of Staphylococcus aureus (SA) and methicillin-resistant Staphylococcus aureus (MRSA) from nasal swabs in patients at risk for nasal colonization. The test utilizes automated real-time polymerase chain reaction  (PCR) to detect MRSA/SA DNA. The Xpert SA Nasal Complete assay is intended to aid in the prevention and control of MRSA/SA infections in healthcare settings. The assay is not intended to diagnose, guide or monitor treatment for MRSA/SA infections, or provide results of susceptibility to methicillin. A negative result does not preclude MRSA/SA nasal colonization.     Respiratory Aerobic Bacterial Culture with Gram Stain [[id number]] Collected: 03/20/23 7238    Order Status: Completed Lab Status: Preliminary result Updated: 03/21/23 1350    Specimen: Sputum from Expectorate      Culture No growth, less than 1 day     Gram Stain Result <10 Squamous epithelial cells/low power field      <25 PMNs/low power field      No organisms seen            Recent Labs   Lab Test 11/06/16  1406 03/07/17  0125   URINEPH 7.5* 6.5   NITRITE Negative Negative   LEUKEST Negative Negative   WBCU  --  0                         Imaging: Chest XR,  PA & LAT    Result Date: 3/20/2023  EXAM: XR CHEST 2 VIEWS LOCATION: [facility] DATE/TIME: 3/20/2023 6:51 PM INDICATION: hemoptysis COMPARISON: CT chest dated 11/06/2016     IMPRESSION: Heart size is enlarged but stable. Tortuous thoracic aorta. Increasing pulmonary parenchymal opacity in the right lower lobe and in the cardiophrenic angle, atelectasis versus infiltrate. Large hiatal hernia with air-fluid level suspected on the lateral view. Given the history of hemoptysis, consider further evaluation of the above findings with CT.    CT Chest Pulmonary Embolism w Contrast    Result Date: 3/20/2023  EXAM: CT CHEST PULMONARY EMBOLISM W CONTRAST LOCATION: [facility] DATE/TIME: 3/20/2023 9:25 PM INDICATION: 4 days hemoptysis, on apixaban for Afib, elevated dimer COMPARISON: None. TECHNIQUE: CT chest pulmonary angiogram during arterial phase injection of IV contrast. Multiplanar reformats and MIP reconstructions were performed. Dose reduction  techniques were used. CONTRAST: 78 mL Isovue 370 FINDINGS: ANGIOGRAM CHEST: Dilated main pulmonary artery measuring 4.3 cm in diameter. There are no left lung, right upper lobe, or right middle lobe pulmonary artery filling defects. Evaluation of the subsegmental pulmonary arteries in the right lower lobe is limited due to suboptimal opacification due to slow flow. Fusiform enlargement of the entire thoracic aorta with mid ascending aorta measuring 4.8 cm in diameter. LUNGS AND PLEURA: Cavitary consolidation is present in the posterior right lower lobe with air-fluid level, measuring around 5.3 x 8.9 cm (series 6, image 149). There is mixed consolidation and groundglass attenuation in the surrounding parenchyma consistent with inflammation and/or hemorrhage. Upper lung predominant emphysema. Anatomic variant with azygos fissure. Segmental atelectasis of the lateral segment right middle lobe. No pleural effusions. MEDIASTINUM: Massive enlargement of the left atrium which measures greater than 12 cm transverse. Moderate enlargement of the right atrium and right ventricle. No pericardial effusion. Esophagus is predominantly decompressed. Imaged thyroid gland is normal. CORONARY ARTERY CALCIFICATION: Moderate. UPPER ABDOMEN: Cholelithiasis. Simple fluid attenuation cyst arising from the upper right kidney does not require specific workup or follow-up. The imaged upper abdominal aorta has patchy atheromatous calcification and measures up to 3.7 cm at the level of the mesenteric vessel origins. MUSCULOSKELETAL: Disc space narrowing and osteophytes of the thoracic spine but no focal compression deformities.     IMPRESSION: 1.  The subsegmental pulmonary arteries in the right lower lobe are not well opacified. No acute pulmonary embolism in the left lung, right upper, or right middle lobes. 2.  Cavitary consolidation/abscess in the posterior right lower lobe with surrounding mixed consolidative and groundglass attenuation  in either inflammation/infection and/or pulmonary hemorrhage. 3.  Severely dilated left atrium, moderately dilated pulmonary arteries and right heart chambers consistent with group 2 pulmonary hypertension. 4.  Emphysema.

## 2023-03-22 NOTE — PLAN OF CARE
Goal Outcome Evaluation:      Plan of Care Reviewed With: patient, spouse    Overall Patient Progress: improvingOverall Patient Progress: improving       DISCHARGE                         3/22/2023  1:42 PM  ----------------------------------------------------------------------------  Discharged to: Home   Via: private transportation; wife  Accompanied by: Family  Discharge Instructions: diet, activity, medications, follow up appointments, when to call the MD, aftercare instructions.  Prescriptions: To be filled by [place] [facility]; medication list reviewed & sent with pt  Follow Up Appointments: arranged; information given  Belongings: All sent with pt  IV: d/c'd  Telemetry: d/c'd  Pt exhibits understanding of above discharge instructions; all questions answered.  Pt wheeled to pharmacy and then to car.   Discharge Paperwork: Signed, copied, and sent home with patient.

## 2023-03-22 NOTE — PROGRESS NOTES
Patient alert and oriented, able to make his needs known. Reported having no coughing blood tonight. Received antibiotics per MAR. Placed pt back on 1.5 L oxygen overnight has he was desating while sleeping.

## 2023-03-22 NOTE — PROGRESS NOTES
"Pt A & O x 4, denies pain, able to make needs known, on room air, PIV saline locked between antibiotics, ate well, family was here and was given an update by provider, lung sounds clear, no discolored sputum this shift, coughing at times, SBA with steady gait, continent of bowel and bladder, /74 (BP Location: Left arm)   Pulse 79   Temp 97.5  F (36.4  C) (Oral)   Resp 16   Ht 1.829 m (6' 0.01\")   Wt 82.8 kg (182 lb 8.7 oz)   SpO2 90%   BMI 24.75 kg/m    "

## 2023-03-22 NOTE — DISCHARGE SUMMARY
[facility] Discharge Summary    Jeff Vanegas MRN# [id number]   Age: 72 year old YOB: 1950     Date of Admission:  3/20/2023  Date of Discharge::  3/22/2023  Admitting Physician:  Lauren Austin MD  Discharge Physician:  Mary Lou Lambert MD  Primary Physician: Lenin Rivera  Transferring Facility: N/A     Home clinic: [facility]          Admission Diagnoses:   Essential hypertension [I10]  Chronic anticoagulation [Z79.01]  Hypoxia [R09.02]  Abscess of lower lobe of right lung without pneumonia (H) [J85.2]  Atrial fibrillation, unspecified type (H) [I48.91]          Discharge Diagnosis:     Principle diagnosis: hemoptysis  Secondary diagnoses:  Patient Active Problem List   Diagnosis     Acute systolic heart failure (H)     Atrial fibrillation (H)     Elevated prostate specific antigen (PSA)     Impotence of organic origin     Essential hypertension     Migraine without status migrainosus, not intractable     Tobacco use     Depression     HLD (hyperlipidemia)     Mitral regurgitation     ACP (advance care planning)     Malignant neoplasm of urinary bladder (H)     Hypokalemia     Nephrolithiasis     Chronic anticoagulation     Abscess of lower lobe of right lung without pneumonia (H)     Atrial fibrillation, unspecified type (H)          Brief History of Presenting Illness:   As per admit hx     72yoM with afib on Eliquis, HTN, BPH, COPD, and bladder CA s/p TURBT (2016) presented to the ED with cough productive of bloody mucus and CEJA for the last four days.  He denies URI symptoms, fever, chills, chest pain, abdominal pain, n/v/d.            Hospital Course:     Hemoptysis- CAP with abscess vs possible malignancy   Pt smoked most of his life. Has had poor appetite last few months. Presented with dark bloody sputum. CT chest showed RLL cavitary consolidation/pulmonary abscess. Has nl WBC/ afebrile but SOB. 1ppd smoker until 25 yrs ago. No dx COPD. Case reviewed by Pulm-ICU provider remotely.  Recommended rx with antbx and follow. Pt started on zosyn/ vanco. Sputum cx sent . Strep pneumo antgn ur-negative. Hg stable.   Pt not coughing bloody sputum today-actually feels a little better. Continues to remain afebrile  AMS rounded-recommended 4 weeks of augmentin and CT in 4 weeks  Will discharge on augmentin x 4 weeks/ doxy x 10 days. Needs repeat CT in 4 weeks and pulmonary referral if not improved. Need to f/u  follow sputum cx. Stop aspirin, continue eliquis.      Acute hypoxic resp failure  Needed 2l o2 NC to keep sats >90%. Likely due to above.   Improved. Stable on RA -even with exertion> 90%    Possible epmphysema  Smoker all life-seems SOB at baseline. CXR shows hyperexpanded lungs. Not wheezing -seems stable.   -will discharge on albuterol INH prn. Needs PFTs.     HTN  Low BP -100-104  -cont atenolol-stop chlorthalidone, losartan, amlodipine. F/u OP     BPH  -cont tamsulosin     afib  stable  -continue  Eliquis/  Atenolol.           Procedures:   No procedures performed during this admission         Allergies:      Allergies   Allergen Reactions     Diatrizoate Hives     IV dye USED DURING KIDNEY SCAN               Medications Prior to Admission:     Medications Prior to Admission   Medication Sig Dispense Refill Last Dose     apixaban ANTICOAGULANT (ELIQUIS) 2.5 MG tablet Eliquis 2.5 mg tablet   Take 1 tablet twice a day by oral route.   3/20/2023 at 0800     Multiple Vitamins-Minerals (MULTIVITAMIN ADULT) TABS Take 1 tablet by mouth daily   3/20/2023 at am     potassium citrate (UROCIT-K) 10 MEQ (1080 MG) CR tablet potassium citrate ER 10 mEq (1,080 mg) tablet,extended release   Take 1 tablet by mouth twice daily   3/20/2023 at am     Probiotic Product (PROBIOTIC DAILY PO) Take 1 capsule by mouth daily    3/20/2023 at am     tamsulosin (FLOMAX) 0.4 MG capsule Take 0.4 mg by mouth every evening    3/19/2023 at hs     [DISCONTINUED] amLODIPine (NORVASC) 5 MG tablet Take 5 mg by mouth daily     3/20/2023 at 0800     [DISCONTINUED] aspirin (ASA) 81 MG EC tablet Take 81 mg by mouth At Bedtime   3/19/2023 at hs     [DISCONTINUED] atenolol-chlorthalidone (TENORETIC) 50-25 MG per tablet Take 1 tablet by mouth daily.   3/20/2023 at 0800     [DISCONTINUED] losartan (COZAAR) 100 MG tablet Take 1 tablet by mouth daily at 2 pm   3/20/2023 at 0800             Discharge Medications:     Current Discharge Medication List      START taking these medications    Details   albuterol (PROAIR HFA/PROVENTIL HFA/VENTOLIN HFA) 108 (90 Base) MCG/ACT inhaler Inhale 2 puffs into the lungs every 6 hours as needed for shortness of breath, wheezing or cough  Qty: 18 g, Refills: 3    Comments: Pharmacy may dispense brand covered by insurance (Proair, or proventil or ventolin or generic albuterol inhaler)  Associated Diagnoses: Abscess of lower lobe of right lung without pneumonia (H)      amoxicillin-clavulanate (AUGMENTIN) 875-125 MG tablet Take 1 tablet by mouth 2 times daily  Qty: 10 tablet, Refills: 0    Associated Diagnoses: Abscess of lower lobe of right lung without pneumonia (H)      atenolol (TENORMIN) 50 MG tablet Take 1 tablet (50 mg) by mouth daily  Qty: 30 tablet, Refills: 1    Associated Diagnoses: Essential hypertension, malignant      doxycycline hyclate (VIBRAMYCIN) 100 MG capsule Take 1 capsule (100 mg) by mouth 2 times daily for 10 days  Qty: 20 capsule, Refills: 0    Associated Diagnoses: Abscess of lower lobe of right lung without pneumonia (H)         CONTINUE these medications which have NOT CHANGED    Details   apixaban ANTICOAGULANT (ELIQUIS) 2.5 MG tablet Eliquis 2.5 mg tablet   Take 1 tablet twice a day by oral route.      Multiple Vitamins-Minerals (MULTIVITAMIN ADULT) TABS Take 1 tablet by mouth daily      potassium citrate (UROCIT-K) 10 MEQ (1080 MG) CR tablet potassium citrate ER 10 mEq (1,080 mg) tablet,extended release   Take 1 tablet by mouth twice daily      Probiotic Product (PROBIOTIC DAILY PO)  "Take 1 capsule by mouth daily       tamsulosin (FLOMAX) 0.4 MG capsule Take 0.4 mg by mouth every evening          STOP taking these medications       amLODIPine (NORVASC) 5 MG tablet Comments:   Reason for Stopping:         aspirin (ASA) 81 MG EC tablet Comments:   Reason for Stopping:         atenolol-chlorthalidone (TENORETIC) 50-25 MG per tablet Comments:   Reason for Stopping:         losartan (COZAAR) 100 MG tablet Comments:   Reason for Stopping:                     Consultations:   No consultations were requested during this admission            Discharge Exam:   Blood pressure 108/76, pulse 63, temperature 98.2  F (36.8  C), temperature source Oral, resp. rate 18, height 1.829 m (6' 0.01\"), weight 83.1 kg (183 lb 3.2 oz), SpO2 95 %.  GENERAL APPEARANCE: healthy, alert and no distress  EYES: conjunctiva clear, eyes grossly normal  HENT: external ears and nose normal   NECK: supple, no masses or adenopathy  RESP: lungs clear to auscultation - no rales, rhonchi or wheezes  CV: regular rate and rhythm, normal S1 S2, no S3 or S4 and no murmur, click or rub   ABDOMEN: soft, nontender, no HSM or masses and bowel sounds normal  MS: no clubbing, cyanosis; no edema  SKIN: clear without significant rashes or lesions  NEURO: Normal strength and tone, sensory exam grossly normal, mentation intact and speech normal    Unresulted Labs Ordered in the Past 30 Days of this Admission     Date and Time Order Name Status Description    3/20/2023 11:41 PM Respiratory Aerobic Bacterial Culture with Gram Stain Preliminary           No results found for this or any previous visit (from the past 24 hour(s)).         Pending Tests at Discharge:   Sputum cx-needs f/u CT in 2 weeks         Discharge Instructions and Follow-Up:     Discharge diet: Regular   Discharge activity: Activity as tolerated   Discharge follow-up: Follow up with primary care provider in 1 week           Discharge Disposition:     Discharged to home    "   Attestation:  I have reviewed today's vital signs, notes, medications, labs and imaging.    Time Spent on this Encounter   I, Mary Lou Lambert MD, personally saw the patient today and spent greater than 30 minutes discharging this patient.    Mary Lou Lambert MD

## 2023-03-23 ENCOUNTER — PATIENT OUTREACH (OUTPATIENT)
Dept: CARE COORDINATION | Facility: CLINIC | Age: 73
End: 2023-03-23
Payer: COMMERCIAL

## 2023-03-23 LAB
BACTERIA SPT CULT: NORMAL
GRAM STAIN RESULT: NORMAL

## 2023-03-24 NOTE — PROGRESS NOTES
Connected Care Resource Center Contact  [facility]/Voicemail     Clinical Data: Transitional Care Management Outreach     Outreach attempted x 2.  Left message on patient's voicemail, providing [facility]'s 24/7 scheduling and nurse triage phone number [phone] ([phone]) for questions/concerns and/or to schedule an appt with an [facility] provider, if they do not have a PCP.      Plan:  [facility] will do no further outreaches at this time.       Rosa Valdovinos MA  Connected Care Resource Center, [facility]    *Connected Care Resource Team does NOT follow patient ongoing. Referrals are identified based on internal discharge reports and the outreach is to ensure patient has an understanding of their discharge instructions.

## 2023-03-30 ENCOUNTER — HOSPITAL ENCOUNTER (OUTPATIENT)
Dept: CT IMAGING | Facility: CLINIC | Age: 73
Discharge: HOME | End: 2023-03-30
Attending: INTERNAL MEDICINE | Admitting: INTERNAL MEDICINE
Payer: COMMERCIAL

## 2023-03-30 DIAGNOSIS — J85.2 ABSCESS OF LOWER LOBE OF RIGHT LUNG WITHOUT PNEUMONIA (H): ICD-10-CM

## 2023-03-30 PROCEDURE — 250N000009 HC RX 250: Performed by: RADIOLOGY

## 2023-03-30 PROCEDURE — 71260 CT THORAX DX C+: CPT

## 2023-03-30 PROCEDURE — 250N000011 HC RX IP 250 OP 636: Performed by: RADIOLOGY

## 2023-03-30 RX ORDER — IOPAMIDOL 755 MG/ML
81 INJECTION, SOLUTION INTRAVASCULAR ONCE
Status: COMPLETED | OUTPATIENT
Start: 2023-03-30 | End: 2023-03-30

## 2023-03-30 RX ADMIN — IOPAMIDOL 81 ML: 755 INJECTION, SOLUTION INTRAVENOUS at 09:04

## 2023-03-30 RX ADMIN — SODIUM CHLORIDE 62 ML: 9 INJECTION, SOLUTION INTRAVENOUS at 09:04

## 2023-09-10 ENCOUNTER — HEALTH MAINTENANCE LETTER (OUTPATIENT)
Age: 73
End: 2023-09-10

## 2024-11-03 ENCOUNTER — HEALTH MAINTENANCE LETTER (OUTPATIENT)
Age: 74
End: 2024-11-03

## 2025-03-01 ENCOUNTER — HOSPITAL ENCOUNTER (EMERGENCY)
Facility: CLINIC | Age: 75
Discharge: HOME | End: 2025-03-01
Attending: FAMILY MEDICINE | Admitting: FAMILY MEDICINE
Payer: COMMERCIAL

## 2025-03-01 VITALS
WEIGHT: 196 LBS | HEART RATE: 57 BPM | SYSTOLIC BLOOD PRESSURE: 126 MMHG | DIASTOLIC BLOOD PRESSURE: 78 MMHG | RESPIRATION RATE: 16 BRPM | BODY MASS INDEX: 25.98 KG/M2 | OXYGEN SATURATION: 94 % | HEIGHT: 73 IN | TEMPERATURE: 97.7 F

## 2025-03-01 DIAGNOSIS — L03.115 CELLULITIS OF RIGHT LOWER LEG: ICD-10-CM

## 2025-03-01 PROCEDURE — 99283 EMERGENCY DEPT VISIT LOW MDM: CPT | Performed by: FAMILY MEDICINE

## 2025-03-01 RX ORDER — CLINDAMYCIN HYDROCHLORIDE 300 MG/1
300 CAPSULE ORAL 4 TIMES DAILY
Qty: 28 CAPSULE | Refills: 0 | Status: SHIPPED | OUTPATIENT
Start: 2025-03-01 | End: 2025-03-08

## 2025-03-01 ASSESSMENT — COLUMBIA-SUICIDE SEVERITY RATING SCALE - C-SSRS
1. IN THE PAST MONTH, HAVE YOU WISHED YOU WERE DEAD OR WISHED YOU COULD GO TO SLEEP AND NOT WAKE UP?: NO
2. HAVE YOU ACTUALLY HAD ANY THOUGHTS OF KILLING YOURSELF IN THE PAST MONTH?: NO
6. HAVE YOU EVER DONE ANYTHING, STARTED TO DO ANYTHING, OR PREPARED TO DO ANYTHING TO END YOUR LIFE?: NO

## 2025-03-01 ASSESSMENT — ACTIVITIES OF DAILY LIVING (ADL): ADLS_ACUITY_SCORE: 55

## 2025-03-01 NOTE — ED PROVIDER NOTES
HPI   Patient is a 74-year-old male presenting with concern for right leg infection.  He has been seeing his primary doctor about lower extremity edema.  He is on a diuretic to help relieve the swelling.  Over the past few days he recognized an area of redness on the distal medial side of his right leg.  This is uncomfortable and tender to the touch.  It is worsening daily.  No fever or other systemic symptoms.  No drainage.  No obvious trauma or injury.      Allergies:  Allergies   Allergen Reactions    Diatrizoate Hives     IV dye USED DURING KIDNEY SCAN  Pt had CT scan with IV contrast on 3/20/23 without pre medication and no issues.     Problem List:    Patient Active Problem List    Diagnosis Date Noted    Chronic anticoagulation 03/21/2023     Priority: Medium    Abscess of lower lobe of right lung without pneumonia (H) 03/21/2023     Priority: Medium    Atrial fibrillation, unspecified type (H) 03/21/2023     Priority: Medium    Hypokalemia 03/07/2017     Priority: Medium    Nephrolithiasis 03/07/2017     Priority: Medium     Several stones, one of which required lithotripsy at [facility].  Latest stone at [facility] - 9mm right ureteropelvic stone 03/2017      Malignant neoplasm of urinary bladder (H) 01/13/2017     Priority: Medium     Overview:   TCC s/p TURBT 3/15/16.      ACP (advance care planning) 01/02/2017     Priority: Medium     Advance Care Planning 1/2/2017: Receipt of ACP document:  Received: Health Care Directive which was witnessed or notarized on 3/15/16.  Document previously scanned on 11/8/16.  Validation form completed and sent to be scanned.  Code Status reflects choices in most recent ACP document.  Confirmed/documented designated decision maker(s).  Added by Silva Blakely RN, Advance Care Planning Liaison.        Mitral regurgitation 11/07/2016     Priority: Medium     Echo 11/7/2016- Left atrium is massively enlarged.There is eccentric MR jet, estimated 2-3+ but may be  underestimated due eccentricity. Consider KEESHA for further assessment if clinically appropriate. Left ventricular systolic function is normal. The visual ejection fraction is estimated at 60-65%. The right ventricle is mildly dilated. The right ventricular systolic function is normal. There is mild to moderate (1-2+) aortic regurgitation. There is mild to moderate (1-2+) tricuspid regurgitation. Right ventricular systolic pressure could be underestimated due to incomplete tricuspid regurgitation velocity envelope. No previous study is available for comparison.      Depression      Priority: Medium    HLD (hyperlipidemia)      Priority: Medium    Acute systolic heart failure (H) 11/06/2016     Priority: Medium     Echo 11/2016:  Left atrium is massively enlarged.  There is eccentric MR jet, estimated 2-3+ but may be underestimated due  eccentricity. Consider KEESHA for further assessment if clinically appropriate.  Left ventricular systolic function is normal.  The visual ejection fraction is estimated at 60-65%.  The right ventricle is mildly dilated.  The right ventricular systolic function is normal.  There is mild to moderate (1-2+) aortic regurgitation.  There is mild to moderate (1-2+) tricuspid regurgitation.  Right ventricular systolic pressure could be underestimated due to incomplete  tricuspid regurgitation velocity envelope.  No previous study is available for comparison.      Atrial fibrillation (H) 12/30/2011     Priority: Medium     New onset 1/2012. CHADS2= 1 so anticoagulated with  mg daily  Re-evaluation 11/7/2016 [id number]= 3      Elevated prostate specific antigen (PSA) 01/27/2009     Priority: Medium    Impotence of organic origin 01/27/2009     Priority: Medium    Essential hypertension 12/12/2006     Priority: Medium    Migraine without status migrainosus, not intractable 12/12/2006     Priority: Medium    Tobacco use 12/12/2006     Priority: Medium      Past Medical History:    Past  "Medical History:   Diagnosis Date    Calculus of kidney 12/12/2006     Past Surgical History:    Past Surgical History:   Procedure Laterality Date    ------------OTHER-------------  2016    transurethral bladder tumor with stenting    COLONOSCOPY N/A 4/9/2015    Procedure: COLONOSCOPY;  Surgeon: Thompson Bernardo MD;  Location: [facility]    CYSTOSCOPY, RETROGRADES, INSERT STENT URETER(S), COMBINED Right 3/7/2017    Procedure: COMBINED CYSTOSCOPY, RETROGRADES, INSERT STENT URETER(S);  Surgeon: DUY Acosta MD;  Location: [facility]    LITHOTRIPSY  1992    Tibialis Posterior Tendon Repair Right 11/2016     Family History:    Family History   Problem Relation Age of Onset    Colon Cancer Father     Diabetes Type 2  Mother      Social History:  Marital Status:   [2]  Social History     Tobacco Use    Smoking status: Some Days     Types: Cigars    Tobacco comments:     couple times a week   Substance Use Topics    Alcohol use: Yes     Alcohol/week: 0.0 standard drinks of alcohol     Comment: occasional      Medications:    albuterol (PROAIR HFA/PROVENTIL HFA/VENTOLIN HFA) 108 (90 Base) MCG/ACT inhaler  apixaban ANTICOAGULANT (ELIQUIS) 2.5 MG tablet  atenolol (TENORMIN) 50 MG tablet  clindamycin (CLEOCIN) 300 MG capsule  Multiple Vitamins-Minerals (MULTIVITAMIN ADULT) TABS  potassium citrate (UROCIT-K) 10 MEQ (1080 MG) CR tablet  Probiotic Product (PROBIOTIC DAILY PO)  tamsulosin (FLOMAX) 0.4 MG capsule      Review of Systems   All other systems reviewed and are negative.      PE   BP: 126/78  Pulse: 57  Temp: 97.7  F (36.5  C)  Resp: 16  Height: 185.4 cm (6' 1\")  Weight: 88.9 kg (196 lb)  SpO2: 94 %  Physical Exam  Vitals and nursing note reviewed.   Constitutional:       General: He is not in acute distress.  HENT:      Head: Atraumatic.      Right Ear: External ear normal.      Left Ear: External ear normal.      Nose: Nose normal.      Mouth/Throat:      Mouth: Mucous membranes are moist.      Pharynx: Oropharynx " is clear.   Eyes:      General: No scleral icterus.     Extraocular Movements: Extraocular movements intact.      Conjunctiva/sclera: Conjunctivae normal.      Pupils: Pupils are equal, round, and reactive to light.   Cardiovascular:      Rate and Rhythm: Normal rate.   Pulmonary:      Effort: Pulmonary effort is normal. No respiratory distress.   Musculoskeletal:         General: Normal range of motion.      Cervical back: Normal range of motion.   Skin:     General: Skin is warm and dry.      Comments: There is an area of redness that is dark in color on the right medial distal leg.  This is approximately 2 cm in diameter.  There seems to be an open sore or wound.  There is no underlying area of swelling.  No fluctuance.  No purulent drainage.  There is some mild surrounding redness as well that is less dark in color.  The whole area is tender.   Neurological:      Mental Status: He is alert and oriented to person, place, and time.   Psychiatric:         Behavior: Behavior normal.         ED COURSE and [facility]   1018.  Leg cellulitis likely.  Clindamycin ordered.  Return for worsening.  Follow up if not improving, as discussed.  Patient and his wife agree with the plan.    Electronic medical chart reviewed, including medical problems, medications, medical allergies, social history.  Recent hospitalizations and surgical procedures reviewed.  Recent clinic visits and consultations reviewed.  Recent labs and test results reviewed.  Nursing notes reviewed.    The patient, their parent if applicable, and/or their medical decision maker(s) and I have reviewed all of the available historical information, applicable PMH, physical exam findings, and objective diagnostic data gathered during this ED visit.  We then discussed all work-up options and then together agreed upon the course taken during this visit.  The ultimate disposition and plan was a cooperative decision made between myself and the patient, their parent if  applicable, and/or their legal decision maker(s).  The risks and benefits of all decisions made during this visit were discussed to the best of my abilities given the circumstances, and all parties are understanding of the pertinent ramifications of these decisions.      LABS  Labs Ordered and Resulted from Time of ED Arrival to Time of ED Departure - No data to display    IMAGING  Images reviewed by me.  Radiology report also reviewed.  No orders to display       Procedures    Medications - No data to display      IMPRESSION       ICD-10-CM    1. Cellulitis of right lower leg  L03.115                  Medication List        Started      clindamycin 300 MG capsule  Commonly known as: CLEOCIN  300 mg, Oral, 4 TIMES DAILY                                  Clint Pena MD  03/01/25 1018

## 2025-03-01 NOTE — DISCHARGE INSTRUCTIONS
RETURN TO THE EMERGENCY ROOM FOR THE FOLLOWING:    Severely worsened pain with expanding redness/swelling/tenderness, fever greater than 101, vomiting, concerning changes in behavior/confusion, fainting, or at anytime for any concern.    FOLLOW UP:    With your primary clinic or back to the ED if not improving over the next 5 days.    TREATMENT RECOMMENDATIONS:    Clindamycin 300 mg 4 times a day over the next 7 days.    NURSE ADVICE LINE:  [phone] or [phone]

## 2025-07-23 ENCOUNTER — TRANSCRIBE ORDERS (OUTPATIENT)
Dept: OTHER | Age: 75
End: 2025-07-23

## 2025-07-23 DIAGNOSIS — Z95.2 S/P MVR (MITRAL VALVE REPLACEMENT): Primary | ICD-10-CM

## 2025-07-23 DIAGNOSIS — Z98.890 S/P TVR (TRICUSPID VALVE REPAIR): ICD-10-CM

## 2025-07-28 ENCOUNTER — HOSPITAL ENCOUNTER (OUTPATIENT)
Dept: CARDIAC REHAB | Facility: CLINIC | Age: 75
Discharge: HOME | End: 2025-07-28
Payer: COMMERCIAL

## 2025-07-28 PROCEDURE — 93797 PHYS/QHP OP CAR RHAB WO ECG: CPT

## 2025-07-28 PROCEDURE — 93798 PHYS/QHP OP CAR RHAB W/ECG: CPT

## 2025-07-30 ENCOUNTER — HOSPITAL ENCOUNTER (OUTPATIENT)
Dept: CARDIAC REHAB | Facility: CLINIC | Age: 75
Discharge: HOME | End: 2025-07-30
Payer: COMMERCIAL

## 2025-07-30 PROCEDURE — 93798 PHYS/QHP OP CAR RHAB W/ECG: CPT

## 2025-08-01 ENCOUNTER — HOSPITAL ENCOUNTER (OUTPATIENT)
Dept: CARDIAC REHAB | Facility: CLINIC | Age: 75
Discharge: HOME | End: 2025-08-01
Payer: COMMERCIAL

## 2025-08-01 PROCEDURE — 93798 PHYS/QHP OP CAR RHAB W/ECG: CPT

## 2025-08-04 ENCOUNTER — HOSPITAL ENCOUNTER (OUTPATIENT)
Dept: CARDIAC REHAB | Facility: CLINIC | Age: 75
Discharge: HOME | End: 2025-08-04
Payer: COMMERCIAL

## 2025-08-04 PROCEDURE — 93798 PHYS/QHP OP CAR RHAB W/ECG: CPT

## 2025-08-11 ENCOUNTER — HOSPITAL ENCOUNTER (OUTPATIENT)
Dept: CARDIAC REHAB | Facility: CLINIC | Age: 75
Discharge: HOME | End: 2025-08-11
Payer: COMMERCIAL

## 2025-08-11 PROCEDURE — 93798 PHYS/QHP OP CAR RHAB W/ECG: CPT

## 2025-08-13 ENCOUNTER — HOSPITAL ENCOUNTER (OUTPATIENT)
Dept: CARDIAC REHAB | Facility: CLINIC | Age: 75
Discharge: HOME | End: 2025-08-13
Payer: COMMERCIAL

## 2025-08-13 PROCEDURE — 93798 PHYS/QHP OP CAR RHAB W/ECG: CPT

## 2025-08-15 ENCOUNTER — HOSPITAL ENCOUNTER (OUTPATIENT)
Dept: CARDIAC REHAB | Facility: CLINIC | Age: 75
Discharge: HOME | End: 2025-08-15
Payer: COMMERCIAL

## 2025-08-15 PROCEDURE — 93798 PHYS/QHP OP CAR RHAB W/ECG: CPT

## 2025-08-18 ENCOUNTER — HOSPITAL ENCOUNTER (OUTPATIENT)
Dept: CARDIAC REHAB | Facility: CLINIC | Age: 75
Discharge: HOME | End: 2025-08-18
Payer: COMMERCIAL

## 2025-08-18 PROCEDURE — 93798 PHYS/QHP OP CAR RHAB W/ECG: CPT

## 2025-08-20 ENCOUNTER — HOSPITAL ENCOUNTER (OUTPATIENT)
Dept: CARDIAC REHAB | Facility: CLINIC | Age: 75
Discharge: HOME | End: 2025-08-20
Payer: COMMERCIAL

## 2025-08-20 PROCEDURE — 93798 PHYS/QHP OP CAR RHAB W/ECG: CPT

## 2025-08-25 ENCOUNTER — HOSPITAL ENCOUNTER (OUTPATIENT)
Dept: CARDIAC REHAB | Facility: CLINIC | Age: 75
Discharge: HOME | End: 2025-08-25
Payer: COMMERCIAL

## 2025-08-25 PROCEDURE — 93798 PHYS/QHP OP CAR RHAB W/ECG: CPT

## 2025-08-27 ENCOUNTER — HOSPITAL ENCOUNTER (OUTPATIENT)
Dept: CARDIAC REHAB | Facility: CLINIC | Age: 75
Discharge: HOME | End: 2025-08-27
Payer: COMMERCIAL

## 2025-08-27 PROCEDURE — 93798 PHYS/QHP OP CAR RHAB W/ECG: CPT

## 2025-08-29 ENCOUNTER — HOSPITAL ENCOUNTER (OUTPATIENT)
Dept: CARDIAC REHAB | Facility: CLINIC | Age: 75
Discharge: HOME | End: 2025-08-29
Payer: COMMERCIAL

## 2025-08-29 PROCEDURE — 93798 PHYS/QHP OP CAR RHAB W/ECG: CPT

## 2025-09-03 ENCOUNTER — HOSPITAL ENCOUNTER (OUTPATIENT)
Dept: CARDIAC REHAB | Facility: CLINIC | Age: 75
Discharge: HOME | End: 2025-09-03
Payer: COMMERCIAL

## 2025-09-03 PROCEDURE — 93798 PHYS/QHP OP CAR RHAB W/ECG: CPT

## (undated) DEVICE — LUBRICATING JELLY 4.25OZ

## (undated) DEVICE — Device

## (undated) DEVICE — GUIDEWIRE SENSOR DUAL FLEX STR 0.035"X150CM M0066703080

## (undated) DEVICE — GLOVE PROTEXIS W/NEU-THERA 8.0  2D73TE80

## (undated) DEVICE — PAD FLOOR SURGISAFE

## (undated) DEVICE — TORQUE OLCOTT HANDLE G17802 OTD-045000

## (undated) DEVICE — SOL NACL 0.9% IRRIG 1000ML BOTTLE 07138-09

## (undated) DEVICE — SYR 20ML SLIP TIP

## (undated) DEVICE — SOL WATER IRRIG 1000ML BOTTLE 07139-09

## (undated) DEVICE — DRAPE C-ARM 60X42" 1013

## (undated) DEVICE — RAD RX OPTIRAY 320 (50ML) IOVERSOL 68% CHARGE PER ML

## (undated) DEVICE — CATH URETERAL OPEN END 05FR 70CM 020015

## (undated) DEVICE — SOL NACL 0.9% IRRIG 3000ML BAG 07972-08

## (undated) RX ORDER — LIDOCAINE HYDROCHLORIDE 10 MG/ML
INJECTION, SOLUTION EPIDURAL; INFILTRATION; INTRACAUDAL; PERINEURAL
Status: DISPENSED
Start: 2017-03-07

## (undated) RX ORDER — FENTANYL CITRATE 50 UG/ML
INJECTION, SOLUTION INTRAMUSCULAR; INTRAVENOUS
Status: DISPENSED
Start: 2017-03-07

## (undated) RX ORDER — PROPOFOL 10 MG/ML
INJECTION, EMULSION INTRAVENOUS
Status: DISPENSED
Start: 2017-03-07

## (undated) RX ORDER — PHENYLEPHRINE HCL IN 0.9% NACL 1 MG/10 ML
SYRINGE (ML) INTRAVENOUS
Status: DISPENSED
Start: 2017-03-07

## (undated) RX ORDER — ONDANSETRON 2 MG/ML
INJECTION INTRAMUSCULAR; INTRAVENOUS
Status: DISPENSED
Start: 2017-03-07

## (undated) RX ORDER — DEXAMETHASONE SODIUM PHOSPHATE 4 MG/ML
INJECTION, SOLUTION INTRA-ARTICULAR; INTRALESIONAL; INTRAMUSCULAR; INTRAVENOUS; SOFT TISSUE
Status: DISPENSED
Start: 2017-03-07